# Patient Record
Sex: FEMALE | Race: WHITE | Employment: FULL TIME | URBAN - METROPOLITAN AREA
[De-identification: names, ages, dates, MRNs, and addresses within clinical notes are randomized per-mention and may not be internally consistent; named-entity substitution may affect disease eponyms.]

---

## 2017-02-17 LAB
T4 FREE SERPL-MCNC: 1.27 NG/DL (ref 0.82–1.77)
TSH SERPL DL<=0.005 MIU/L-ACNC: 0.66 UIU/ML (ref 0.45–4.5)

## 2017-04-11 ENCOUNTER — HOSPITAL ENCOUNTER (EMERGENCY)
Age: 32
Discharge: HOME | End: 2017-04-11
Attending: FAMILY MEDICINE

## 2017-04-12 ENCOUNTER — TELEPHONE (OUTPATIENT)
Dept: ENDOCRINOLOGY | Age: 32
End: 2017-04-12

## 2017-04-12 LAB
T4 FREE SERPL-MCNC: 2.95 NG/DL (ref 0.82–1.77)
TSH SERPL DL<=0.005 MIU/L-ACNC: 0.01 UIU/ML (ref 0.45–4.5)

## 2017-04-12 NOTE — TELEPHONE ENCOUNTER
----- Message from Aldair Lawson MD sent at 4/12/2017  2:50 PM EDT -----  Thyroid is fast - so I donot think we can stop Methimazole    She has to restart 5 mg of Methimazole     LAbs TSH ,FT4 BNV

## 2017-04-12 NOTE — TELEPHONE ENCOUNTER
Called patient and gave her 's recommendations of restarting 5mg of methimazole daily due to overactive thyroid. Patient verbalized understanding.

## 2017-04-12 NOTE — PROGRESS NOTES
Thyroid is fast - so I donot think we can stop Methimazole    She has to restart 5 mg of Methimazole     LAbs TSH ,FT4 BNV

## 2017-04-12 NOTE — TELEPHONE ENCOUNTER
Attempted to call. Unsuccessful. Left msg for Roseline Harris to give us a call back at the office. A callback number was left.

## 2017-04-13 ENCOUNTER — TELEPHONE (OUTPATIENT)
Dept: ENDOCRINOLOGY | Age: 32
End: 2017-04-13

## 2017-04-15 ENCOUNTER — HOSPITAL ENCOUNTER (EMERGENCY)
Age: 32
Discharge: HOME | End: 2017-04-15
Attending: FAMILY MEDICINE

## 2017-04-15 ENCOUNTER — HOSPITAL ENCOUNTER (OUTPATIENT)
Dept: LAB | Age: 32
Discharge: HOME | End: 2017-04-15

## 2017-04-15 VITALS
TEMPERATURE: 98.4 F | BODY MASS INDEX: 27.99 KG/M2 | RESPIRATION RATE: 18 BRPM | HEART RATE: 98 BPM | DIASTOLIC BLOOD PRESSURE: 63 MMHG | OXYGEN SATURATION: 99 % | WEIGHT: 168 LBS | HEIGHT: 65 IN | SYSTOLIC BLOOD PRESSURE: 134 MMHG

## 2017-04-15 DIAGNOSIS — J06.9 VIRAL URI: Primary | ICD-10-CM

## 2017-04-15 LAB
INFLUENZA A AG (POC): NORMAL
INFLUENZA AG (POC) NEGATIVE CTRL.: NORMAL
INFLUENZA AG (POC) POSITIVE CTRL.: NORMAL
INFLUENZA B AG (POC): NORMAL
LOT NUMBER POC: NORMAL
S PYO AG THROAT QL: NEGATIVE

## 2017-04-15 PROCEDURE — 87070 CULTURE OTHR SPECIMN AEROBIC: CPT | Performed by: FAMILY MEDICINE

## 2017-04-15 NOTE — DISCHARGE INSTRUCTIONS
Viral Respiratory Infection: Care Instructions  Your Care Instructions  Viruses are very small organisms. They grow in number after they enter your body. There are many types that cause different illnesses, such as colds and the mumps. The symptoms of a viral respiratory infection often start quickly. They include a fever, sore throat, and runny nose. You may also just not feel well. Or you may not want to eat much. Most viral respiratory infections are not serious. They usually get better with time and self-care. Antibiotics are not used to treat a viral infection. That's because antibiotics will not help cure a viral illness. In some cases, antiviral medicine can help your body fight a serious viral infection. Follow-up care is a key part of your treatment and safety. Be sure to make and go to all appointments, and call your doctor if you are having problems. It's also a good idea to know your test results and keep a list of the medicines you take. How can you care for yourself at home? · Rest as much as possible until you feel better. · Be safe with medicines. Take your medicine exactly as prescribed. Call your doctor if you think you are having a problem with your medicine. You will get more details on the specific medicine your doctor prescribes. · Take an over-the-counter pain medicine, such as acetaminophen (Tylenol), ibuprofen (Advil, Motrin), or naproxen (Aleve), as needed for pain and fever. Read and follow all instructions on the label. Do not give aspirin to anyone younger than 20. It has been linked to Reye syndrome, a serious illness. · Drink plenty of fluids, enough so that your urine is light yellow or clear like water. Hot fluids, such as tea or soup, may help relieve congestion in your nose and throat. If you have kidney, heart, or liver disease and have to limit fluids, talk with your doctor before you increase the amount of fluids you drink.   · Try to clear mucus from your lungs by breathing deeply and coughing. · Gargle with warm salt water once an hour. This can help reduce swelling and throat pain. Use 1 teaspoon of salt mixed in 1 cup of warm water. · Do not smoke or allow others to smoke around you. If you need help quitting, talk to your doctor about stop-smoking programs and medicines. These can increase your chances of quitting for good. To avoid spreading the virus  · Cough or sneeze into a tissue. Then throw the tissue away. · If you don't have a tissue, use your hand to cover your cough or sneeze. Then clean your hand. You can also cough into your sleeve. · Wash your hands often. Use soap and warm water. Wash for 15 to 20 seconds each time. · If you don't have soap and water near you, you can clean your hands with alcohol wipes or gel. When should you call for help? Call your doctor now or seek immediate medical care if:  · You have a new or higher fever. · Your fever lasts more than 48 hours. · You have trouble breathing. · You have a fever with a stiff neck or a severe headache. · You are sensitive to light. · You feel very sleepy or confused. Watch closely for changes in your health, and be sure to contact your doctor if:  · You do not get better as expected. Where can you learn more? Go to http://nallely-art.info/. Enter G470 in the search box to learn more about \"Viral Respiratory Infection: Care Instructions. \"  Current as of: June 30, 2016  Content Version: 11.2  © 1210-0550 [employer]. Care instructions adapted under license by [employer] (which disclaims liability or warranty for this information). If you have questions about a medical condition or this instruction, always ask your healthcare professional. [address] any warranty or liability for your use of this information.

## 2017-04-15 NOTE — UC PROVIDER NOTE
Patient is a 29 y.o. female presenting with cough. The history is provided by the patient. Cough   This is a new problem. The current episode started yesterday. The problem occurs every few minutes. The problem has not changed since onset. The cough is non-productive. Patient reports a subjective fever - was not measured. The fever has been present for less than 1 day. Associated symptoms include rhinorrhea, sore throat and myalgias. Pertinent negatives include no chest pain, no chills, no sweats, no ear congestion, no ear pain, no headaches, no shortness of breath, no wheezing, no nausea and no vomiting. Treatments tried: theraflu. The treatment provided no relief. Her past medical history does not include asthma. Past Medical History:   Diagnosis Date    Endocrine disease     hypothyroid    Goiter     Graves disease     Prediabetes     Retinal detachment         Past Surgical History:   Procedure Laterality Date    HX HEENT      jaw surgery    HX TUMOR REMOVAL      facial tumor removed         Family History   Problem Relation Age of Onset    Diabetes Father         Social History     Social History    Marital status:      Spouse name: N/A    Number of children: N/A    Years of education: N/A     Occupational History    Not on file. Social History Main Topics    Smoking status: Never Smoker    Smokeless tobacco: Never Used    Alcohol use No    Drug use: No    Sexual activity: Yes     Partners: Male     Birth control/ protection: None     Other Topics Concern    Not on file     Social History Narrative                ALLERGIES: Ibuprofen    Review of Systems   Constitutional: Negative for chills and fever. HENT: Positive for congestion, rhinorrhea and sore throat. Negative for ear pain. Respiratory: Positive for cough. Negative for shortness of breath and wheezing. Cardiovascular: Negative for chest pain and palpitations.    Gastrointestinal: Negative for nausea and vomiting. Musculoskeletal: Positive for myalgias. Skin: Negative for rash. Neurological: Negative for headaches. Hematological: Negative for adenopathy. Vitals:    04/15/17 1115   BP: 134/63   Pulse: 98   Resp: 18   Temp: 98.4 °F (36.9 °C)   SpO2: 99%   Weight: 76.2 kg (168 lb)   Height: 5' 5\" (1.651 m)       Physical Exam   Constitutional: She appears well-developed and well-nourished. No distress. HENT:   Right Ear: Tympanic membrane, external ear and ear canal normal.   Left Ear: Tympanic membrane, external ear and ear canal normal.   Nose: Rhinorrhea present. Right sinus exhibits no maxillary sinus tenderness and no frontal sinus tenderness. Left sinus exhibits no maxillary sinus tenderness and no frontal sinus tenderness. Mouth/Throat: Mucous membranes are normal. Posterior oropharyngeal edema and posterior oropharyngeal erythema present. No oropharyngeal exudate or tonsillar abscesses. Cardiovascular: Normal rate, regular rhythm and normal heart sounds. Pulmonary/Chest: Effort normal and breath sounds normal. No respiratory distress. She has no wheezes. She has no rales. Lymphadenopathy:     She has no cervical adenopathy. Neurological: She is alert. Skin: She is not diaphoretic. Psychiatric: She has a normal mood and affect. Her behavior is normal. Judgment and thought content normal.   Nursing note and vitals reviewed. MDM     Differential Diagnosis; Clinical Impression; Plan:     CLINICAL IMPRESSION:  Viral URI  (primary encounter diagnosis)    Plan:  1. Increase fluids  2. Tylenol/motrin prn  3. PCP if no improvement  Amount and/or Complexity of Data Reviewed:   Clinical lab tests:  Ordered and reviewed  Risk of Significant Complications, Morbidity, and/or Mortality:   Presenting problems: Moderate  Diagnostic procedures: Moderate  Management options:   Moderate  General Comments: Strep test: negative  Influenza test: negative  Progress:   Patient progress: Stable      Procedures

## 2017-04-17 LAB
BACTERIA SPEC CULT: NORMAL
SERVICE CMNT-IMP: NORMAL

## 2017-04-21 ENCOUNTER — OFFICE VISIT (OUTPATIENT)
Dept: INTERNAL MEDICINE CLINIC | Age: 32
End: 2017-04-21

## 2017-04-21 VITALS
SYSTOLIC BLOOD PRESSURE: 119 MMHG | DIASTOLIC BLOOD PRESSURE: 73 MMHG | TEMPERATURE: 98 F | BODY MASS INDEX: 27.99 KG/M2 | WEIGHT: 168 LBS | HEIGHT: 65 IN | RESPIRATION RATE: 16 BRPM | OXYGEN SATURATION: 99 % | HEART RATE: 84 BPM

## 2017-04-21 DIAGNOSIS — H61.21 IMPACTED CERUMEN, RIGHT EAR: ICD-10-CM

## 2017-04-21 DIAGNOSIS — Z00.00 WELL WOMAN EXAM (NO GYNECOLOGICAL EXAM): Primary | ICD-10-CM

## 2017-04-21 DIAGNOSIS — R73.03 PREDIABETES: ICD-10-CM

## 2017-04-21 DIAGNOSIS — Z88.9 H/O SEASONAL ALLERGIES: ICD-10-CM

## 2017-04-21 NOTE — PROGRESS NOTES
Chief Complaint   Patient presents with    Complete Physical    Allergies     has been coughing for 1 week     she is a 29y.o. year old female who presents for CPE  Complete Physical Exam  Pre-Visit Questions:    LMP -  [id number]  Last Pap - October2016  Last Mammogram -n/a  Hx of abnl Pap -  yes    1. Do you follow a low fat diet?  no  2. Are you up to date on your Tdap (<10 years)? Yes  3. Have you ever had a Pneumovax vaccine (>65)? No   PCV13 No   PPSV23 No  4. Have you had Zoster vaccine (>60)? No  5. Have you had the HPV - Gardasil (13- 26)? No  6. Do you follow an exercise program?  yes  7. Do you smoke?  no  8. Do you consider yourself overweight?  yes  9. Is there a family history of CAD< age 48? Unknown  10. Is there a family history of Cancer?  yes  11. Do you know your Cancer risks? Yes  12. Have you had a colonoscopy?  no  13. Have you been tested for HIV or other STI's? Yes HIV testing today(18-66 y/o)? Yes  14. Have had a bone density scan or DEXA done(>65)? No  15. Have you had an EKG performed in the last five years (>50)? Unknown    Other complaints:    Reviewed and agree with Nurse Note and duplicated in this note. Reviewed PmHx, RxHx, FmHx, SocHx, AllgHx and updated and dated in the chart.     Family History   Problem Relation Age of Onset    Diabetes Father        Past Medical History:   Diagnosis Date    Endocrine disease     hypothyroid    Goiter     Graves disease     Prediabetes     Retinal detachment       Social History     Social History    Marital status:      Spouse name: N/A    Number of children: N/A    Years of education: N/A     Social History Main Topics    Smoking status: Never Smoker    Smokeless tobacco: Never Used    Alcohol use No    Drug use: No    Sexual activity: Yes     Partners: Male     Birth control/ protection: None     Other Topics Concern    None     Social History Narrative        Review of Systems - negative except as listed above  {ros master:593763}    Objective:     Vitals:    04/21/17 0934   Weight: 168 lb (76.2 kg)       Physical Examination: {female adult master:253251}    Patient was informed/counseled on: Body mass index is 27.96 kg/(m^2). Discussed the patient's {MU BMI REASON:857347997} BMI with her. The BMI follow up plan is as follows: {PLAN -  Place appropriate order along with your documentation:45965}    Assessment/ Plan:   There are no diagnoses linked to this encounter. Follow-up Disposition: Not on File    Labs to be drawn: CBC, CMP, Lipid, Vit d - 25    I have discussed the diagnosis with the patient and the intended plan as seen in the above orders. The patient has received an after-visit summary and questions were answered concerning future plans. Medication Side Effects and Warnings were discussed with patient: {yes/ no default yes:28609::\"yes\"}  Patient Labs were reviewed and or requested: {yes/ no default yes:11062::\"yes\"}  Patient Past Records were reviewed and or requested  {yes/ no default yes:73909::\"yes\"}  I have discussed the diagnosis with the patient and the intended plan as seen in the above orders. The patient has received an after-visit summary and questions were answered concerning future plans. Pt agrees to call or return to clinic and/or go to closest ER with any worsening of symptoms. This may include, but not limited to increased fever (>100.4) with NSAIDS or Tylenol, increased edema, confusion, rash, worsening of presenting symptoms. Patient was informed/counseled to:    1) Remember to stay active and/or exercise regularly (I suggest 30-45 minutes daily)   2) For reliable dietary information, go to www. EATRIGHT.org. You may wish to consider seeing the nutritionist at [facility] at #[phone] or [phone], also consider the [phone]   3) I routinely suggest a complete physical exam once each year (your birth month)

## 2017-04-21 NOTE — PROGRESS NOTES
CCP: [facility]    S: Idania Rg is a 29 y.o. female who presents to [facility] and for CPE. Complete Physical Exam  Pre-Visit Questions:    LMP -  [id number]  Last Pap - October2016  Last Mammogram -n/a  Hx of abnl Pap -  yes    1. Do you follow a low fat diet?  no  2. Are you up to date on your Tdap (<10 years)? Yes  3. Have you ever had a Pneumovax vaccine (>65)? No   PCV13 No   PPSV23 No  4. Have you had Zoster vaccine (>60)? No  5. Have you had the HPV - Gardasil (13- 26)? No  6. Do you follow an exercise program?  yes  7. Do you smoke?  no  8. Do you consider yourself overweight?  yes  9. Is there a family history of CAD< age 48? Unknown  10. Is there a family history of Cancer?  yes  11. Do you know your Cancer risks? Yes  12. Have you had a colonoscopy?  no  13. Have you been tested for HIV or other STI's? Yes HIV testing today(18-66 y/o)? Yes  14. Have had a bone density scan or DEXA done(>65)? No  15. Have you had an EKG performed in the last five years (>50)? UnknownShe has having seasonal allergies for past week. C/o ryunny nose, non-productive cough that is worse at night. Has tried benadryl at night which helps her sleep but not helping with cough. Sees Dr. Josefina Sandoval for her Grave's Disease. She is taking 5mg methimazole. Reports she feels thirsty a lot and increased hunger. Was pre-diabetic at last A1C check June 2016. Patient's last menstrual period was 04/04/2017. Birth Control: Pt is currently taking MARTA for birth control which she is taking as prescribed without any side effects or difficulties. Denies GYN symptoms including discharge, itching, dyspareunia, or recent changes in cycle. Denies excessive cramping, bloating, moodiness or breast tenderness. Family history significant for DM (father and mother) and BRCA (paternal aunt).      Social history:   Nutrition: eats traditional [place] foods, pork, chicken and stir-fried veggies, lots of rice each meal; drinking soda, 1-2 cups a day at work  Physical: no formal exercise, doesn't have time with work schedule  Occupation: works at [address] [employer] Ish: denies  Drug: denies  Alcohol: denies    Review of Systems:  - Constitutional Symptoms: no fevers, chills, weight loss  - Eyes: no blurry vision or double vision  - Cardiovascular: no chest pain or palpitations  - Respiratory: no cough or shortness of breath  - Gastrointestinal: no dysphagia or abdominal pain  - Musculoskeletal: no joint pains or weakness  - Neurological: no numbness, tingling, or headaches  - Psychiatric: no depression or anxiety      Past Medical History:   Diagnosis Date    Endocrine disease     hypothyroid    Goiter     Graves disease     Prediabetes     Retinal detachment        Current Outpatient Prescriptions   Medication Sig Dispense Refill    FALMINA, 28, 0.1-20 mg-mcg tab       methIMAzole (TAPAZOLE) 5 mg tablet 1 tab daily 30 Tab 8       Pt is taking all medications as prescribed without any side effects or difficulty. Allergies   Allergen Reactions    Ibuprofen Swelling     Eyes            Health Maintenance:    PAP: last 10/2016 at [facility], reports it was normal  Eye exam: march 2017    O: VS:   Visit Vitals    /73 (BP 1 Location: Left arm, BP Patient Position: Sitting)    Pulse 84    Temp 98 °F (36.7 °C) (Oral)    Resp 16    Ht 5' 5\" (1.651 m)    Wt 168 lb (76.2 kg)    LMP 04/04/2017    SpO2 99%    BMI 27.96 kg/m2     GENERAL: Diana Wheat is sitting on exam table in no acute distress. Non-toxic. Well nourished. Well developed. Appropriately groomed. HEAD:  Normocephalic. Atraumatic. Non tender sinuses x 4. EYE: PERRLA. EOMs intact. Sclera anicteric without injection. No drainage or discharge. EARS: R ear canal cerumen obstructing TM; Hearing intact bilaterally. External ear canals normal without evidence of blood or swelling. left TM's intact, pearly grey with landmarks visible. No erythema or effusion.    NOSE: Mildly erythematous; patent. Nasal turbinates pink. No polyps noted. No erythema. No discharge. MOUTH: mucous membranes pink and moist. Posterior pharynx normal with cobblestone appearance. No erythema, white exudate or obstruction. NECK: small goiter visible and palpable; supple. Midline trachea. RESP: Breath sounds are symmetrical bilaterally. Unlabored without SOB. Speaking in full sentences. Clear to auscultation bilaterally anteriorly and posteriorly. No wheezes. No rales or rhonchi. CV: normal rate. Regular rhythm. S1, S2 audible. No murmur noted. No rubs, clicks or gallops noted. ABDOMEN: Flat without bulges or pulsations. Soft and nondistended. No tenderness on palpation. No masses or organomegaly. No rebound, rigidity or guarding. Bowel sounds normal x 4 quadrants. NEURO:  awake, alert and oriented to person, place, and time and event. Cranial nerves II through XII grossly intact. Clear speech. Muscle strength is +5/5 x 4 extremities. Sensation is intact to light touch bilaterally. Steady gait. MUSC:  Intact x 4 extremities. Full ROM x 4 extremities. No pain with movement. HEME/LYMPH: peripheral pulses palpable 2+ radial.  No peripheral edema is noted. No cervical adenopathy noted. SKIN: clean dry and intact throughout. No rashes, erythema, ecchymosis, lacerations, abrasions, suspicious moles noted. PSYCH: appropriate behavior, dress and thought processes. Good eye contact. Clear and coherent speech. Full affect. Good insight. Assessment and Plan:  Jaime Escalera was seen today for complete physical and allergies.     Diagnoses and all orders for this visit:    Well woman exam (no gynecological exam)  -     CBC W/O DIFF  -     METABOLIC PANEL, COMPREHENSIVE  -     LIPID PANEL  -     VITAMIN D, 25 HYDROXY  -     HEMOGLOBIN A1C WITH EAG  -     HIV 1/2 AG/AB, 4TH GENERATION,W RFLX CONFIRM    Prediabetes    H/O seasonal allergies    Impacted cerumen, right ear  -     REMOVE IMPACTED EAR WAX    Discussed allergy mgmt and mgmt of postnasal drip (see pt instructions). Also discussed in detail need for lifestyle changes, cutting back on rice and soda in diet due to pre-diabetes state, current symptoms of polyuria and polydipsia and famiy history of DM. Will do labs today to assess for progress of DM. Patient education was done. Advised on nutrition, physical activity, weight management, tobacco, alcohol and safety. Counseling included discussion of diagnosis, differentials, treatment options, prescribed treatment, warning signs and follow up. Medication risks/benefits,interactions and alternatives discussed with patient.      Patient verbalized understanding and agreed to plan of care. Patient was given an after visit summary which included current diagnoses, medications and vital signs. Request [facility] records today for pap results      Follow up as needed     Patient Instructions   Try to cut back on your portions of rice and soda during the day. Limit caffeinated beverages to one per day. Allergy management:     1)  Take a daily antihistamine to reduce allergic symptoms such as sneezing, runny nose and itchy eyes. You can buy these over the counter (OTC = no prescription needed) such as Claritin, Allegra or Zyrtec. Take this daily as it takes 3-4 weeks for the full therapeutic effect to take place. Follow directions on package. If symptoms of sneezing, coughing and runny nose are severe, you can try taking Benadryl at night before bed. However, Benadryl can cause drowsiness, so please use caution. Elderly people should not use Benadryl due to side effects and increase risk of falling. 2)  Use an OTC decongestant nasal spray such as Flonase, Nasonex, or Rhinocort. These contain a steroid and will help reduce congestion. You can spray 2x in each nostril two times a day.   Use the opposite hand of the nostril you are spraying and look down at your toes when you administer the nasal spray. This ensures the spray is applied to the nasal tissue properly. 3) You can also use a saline nasal spray 3-4 times a day or a mia potty (never use tap water due to possible bacterial contamination) to help flush out the sinuses. This helps reduce the irritants that can increase allergic symptoms. 4)  OTC Robitussin or Delsym can be used for cough relief. Follow directions on package. Do not exceed maximum dose. May cause drowsiness    5) Warm salt water gargle can help alleviate sore throat    6) Try taking a teaspoon of local honey 2x day (but no more than 1 tablespoon a day) - to help strengthen your body's tolerance to allergens. It is important to buy local honey as the bees use plants in your area to produce their honey. Honey is also a natural cough suppressant. Eat a healthy diet, drink plenty of water and get 8 hours of sleep nightly.

## 2017-04-21 NOTE — PATIENT INSTRUCTIONS
Try to cut back on your portions of rice and soda during the day. Limit caffeinated beverages to one per day. Allergy management:     1)  Take a daily antihistamine to reduce allergic symptoms such as sneezing, runny nose and itchy eyes. You can buy these over the counter (OTC = no prescription needed) such as Claritin, Allegra or Zyrtec. Take this daily as it takes 3-4 weeks for the full therapeutic effect to take place. Follow directions on package. If symptoms of sneezing, coughing and runny nose are severe, you can try taking Benadryl at night before bed. However, Benadryl can cause drowsiness, so please use caution. Elderly people should not use Benadryl due to side effects and increase risk of falling. 2)  Use an OTC decongestant nasal spray such as Flonase, Nasonex, or Rhinocort. These contain a steroid and will help reduce congestion. You can spray 2x in each nostril two times a day. Use the opposite hand of the nostril you are spraying and look down at your toes when you administer the nasal spray. This ensures the spray is applied to the nasal tissue properly. 3) You can also use a saline nasal spray 3-4 times a day or a mia potty (never use tap water due to possible bacterial contamination) to help flush out the sinuses. This helps reduce the irritants that can increase allergic symptoms. 4)  OTC Robitussin or Delsym can be used for cough relief. Follow directions on package. Do not exceed maximum dose. May cause drowsiness    5) Warm salt water gargle can help alleviate sore throat    6) Try taking a teaspoon of local honey 2x day (but no more than 1 tablespoon a day) - to help strengthen your body's tolerance to allergens. It is important to buy local honey as the bees use plants in your area to produce their honey. Honey is also a natural cough suppressant. Eat a healthy diet, drink plenty of water and get 8 hours of sleep nightly.

## 2017-04-21 NOTE — MR AVS SNAPSHOT
Visit Information Date & Time Provider Department Dept. Phone Encounter #  
 4/21/2017  9:30 AM Chauncey England MD [facility] Sports Medicine and [place] [id number] Follow-up Instructions Return if symptoms worsen or fail to improve. Follow-up and Disposition History Your Appointments 6/14/2017  1:30 PM  
ROUTINE CARE with Lang Pryor MD  
Care Diabetes & Endocrinology [facility]) Appt Note: 6mo fudm 100 15[facility] [address] [facility] [id number] [phone]  
  
   
 [address] [place] [id number] Upcoming Health Maintenance Date Due DTaP/Tdap/Td series (1 - Tdap) 1/18/2008 PAP AKA CERVICAL CYTOLOGY 6/6/2016 Allergies as of 4/21/2017  Review Complete On: 4/21/2017 By: Chauncey England MD  
  
 Severity Noted Reaction Type Reactions Ibuprofen  03/24/2014    Swelling Eyes Current Immunizations  Reviewed on 6/24/2013 Name Date  
 TB Skin Test (PPD) Intradermal 6/24/2013 Not reviewed this visit You Were Diagnosed With   
  
 Codes Comments Well woman exam (no gynecological exam)    -  Primary ICD-10-CM: Z00.00 ICD-9-CM: V70.0 Prediabetes     ICD-10-CM: R73.03 
ICD-9-CM: 790.29   
 H/O seasonal allergies     ICD-10-CM: Z88.9 ICD-9-CM: V15.09 Impacted cerumen, right ear     ICD-10-CM: H61.21 ICD-9-CM: 380.4 Vitals BP Pulse Temp Resp Height(growth percentile) Weight(growth percentile) 119/73 (BP 1 Location: Left arm, BP Patient Position: Sitting) 84 98 °F (36.7 °C) (Oral) 16 5' 5\" (1.651 m) 168 lb (76.2 kg) LMP SpO2 BMI OB Status Smoking Status 04/04/2017 99% 27.96 kg/m2 Having regular periods Never Smoker Vitals History BMI and BSA Data Body Mass Index Body Surface Area  
 27.96 kg/m 2 1.87 m 2 Preferred Pharmacy Pharmacy Name Phone [facility] Artemisa Epley, [address] [place] [facility] [phone] Your Updated Medication List  
  
   
This list is accurate as of: 4/21/17 10:52 AM.  Always use your most recent med list.  
  
  
  
  
 [place] Haste (28) 0.1-20 mg-mcg Tab Generic drug:  levonorgestrel-ethinyl estradiol  
  
 methIMAzole 5 mg tablet Commonly known as:  TAPAZOLE  
1 tab daily We Performed the Following CBC W/O DIFF [05700 CPT(R)] HEMOGLOBIN A1C WITH EAG [02175 CPT(R)] HIV 1/2 AG/AB, 4TH GENERATION,W RFLX CONFIRM [BGE78009 Custom] LIPID PANEL [72159 CPT(R)] METABOLIC PANEL, COMPREHENSIVE [95672 CPT(R)] REMOVE IMPACTED EAR WAX [43940 CPT(R)] VITAMIN D, 25 HYDROXY [id number] CPT(R)] Follow-up Instructions Return if symptoms worsen or fail to improve. Patient Instructions Try to cut back on your portions of rice and soda during the day. Limit caffeinated beverages to one per day. Allergy management:  
 
1)  Take a daily antihistamine to reduce allergic symptoms such as sneezing, runny nose and itchy eyes. You can buy these over the counter (OTC = no prescription needed) such as Claritin, Allegra or Zyrtec. Take this daily as it takes 3-4 weeks for the full therapeutic effect to take place. Follow directions on package. If symptoms of sneezing, coughing and runny nose are severe, you can try taking Benadryl at night before bed. However, Benadryl can cause drowsiness, so please use caution. Elderly people should not use Benadryl due to side effects and increase risk of falling. 2)  Use an OTC decongestant nasal spray such as Flonase, Nasonex, or Rhinocort. These contain a steroid and will help reduce congestion. You can spray 2x in each nostril two times a day. Use the opposite hand of the nostril you are spraying and look down at your toes when you administer the nasal spray. This ensures the spray is applied to the nasal tissue properly.    
 
3) You can also use a saline nasal spray 3-4 times a day or a mia potty (never use tap water due to possible bacterial contamination) to help flush out the sinuses. This helps reduce the irritants that can increase allergic symptoms. 4)  OTC Robitussin or Delsym can be used for cough relief. Follow directions on package. Do not exceed maximum dose. May cause drowsiness 5) Warm salt water gargle can help alleviate sore throat 6) Try taking a teaspoon of local honey 2x day (but no more than 1 tablespoon a day) - to help strengthen your body's tolerance to allergens. It is important to buy local honey as the bees use plants in your area to produce their honey. Honey is also a natural cough suppressant. Eat a healthy diet, drink plenty of water and get 8 hours of sleep nightly. Introducing [facility] & HEALTH SERVICES! Mati Levin introduces [employer] patient portal. Now you can access parts of your medical record, email your doctor's office, and request medication refills online. 1. In your internet browser, go to [email or web address] 2. Click on the First Time User? Click Here link in the Sign In box. You will see the New Member Sign Up page. 3. Enter your [employer] Access Code exactly as it appears below. You will not need to use this code after youve completed the sign-up process. If you do not sign up before the expiration date, you must request a new code. · [employer] Access Code: [email or web address] Expires: 7/14/2017 11:04 AM 
 
4. Enter the last four digits of your Social Security Number (xxxx) and Date of Birth (mm/dd/yyyy) as indicated and click Submit. You will be taken to the next sign-up page. 5. Create a [employer] ID. This will be your [employer] login ID and cannot be changed, so think of one that is secure and easy to remember. 6. Create a [employer] password. You can change your password at any time. 7. Enter your Password Reset Question and Answer. This can be used at a later time if you forget your password. 8. Enter your e-mail address. You will receive e-mail notification when new information is available in [address] 9. Click Sign Up. You can now view and download portions of your medical record. 10. Click the Download Summary menu link to download a portable copy of your medical information. If you have questions, please visit the Frequently Asked Questions section of the [employer] website. Remember, [employer] is NOT to be used for urgent needs. For medical emergencies, dial 911. Now available from your iPhone and Android! Please provide this summary of care documentation to your next provider. Your primary care clinician is listed as Willi DELA CRUZ. If you have any questions after today's visit, please call [phone].

## 2017-04-22 LAB
25(OH)D3+25(OH)D2 SERPL-MCNC: 14.4 NG/ML (ref 30–100)
ALBUMIN SERPL-MCNC: 4.4 G/DL (ref 3.5–5.5)
ALBUMIN/GLOB SERPL: 1.6 {RATIO} (ref 1.2–2.2)
ALP SERPL-CCNC: 39 IU/L (ref 39–117)
ALT SERPL-CCNC: 10 IU/L (ref 0–32)
AST SERPL-CCNC: 10 IU/L (ref 0–40)
BILIRUB SERPL-MCNC: <0.2 MG/DL (ref 0–1.2)
BUN SERPL-MCNC: 9 MG/DL (ref 6–20)
BUN/CREAT SERPL: 19 (ref 9–23)
CALCIUM SERPL-MCNC: 8.9 MG/DL (ref 8.7–10.2)
CHLORIDE SERPL-SCNC: 104 MMOL/L (ref 96–106)
CHOLEST SERPL-MCNC: 167 MG/DL (ref 100–199)
CO2 SERPL-SCNC: 23 MMOL/L (ref 18–29)
CREAT SERPL-MCNC: 0.48 MG/DL (ref 0.57–1)
ERYTHROCYTE [DISTWIDTH] IN BLOOD BY AUTOMATED COUNT: 12.6 % (ref 12.3–15.4)
EST. AVERAGE GLUCOSE BLD GHB EST-MCNC: 123 MG/DL
GLOBULIN SER CALC-MCNC: 2.7 G/DL (ref 1.5–4.5)
GLUCOSE SERPL-MCNC: 97 MG/DL (ref 65–99)
HBA1C MFR BLD: 5.9 % (ref 4.8–5.6)
HCT VFR BLD AUTO: 33.8 % (ref 34–46.6)
HDLC SERPL-MCNC: 38 MG/DL
HGB BLD-MCNC: 11 G/DL (ref 11.1–15.9)
HIV 1+2 AB+HIV1 P24 AG SERPL QL IA: NON REACTIVE
LDLC SERPL CALC-MCNC: 105 MG/DL (ref 0–99)
MCH RBC QN AUTO: 25.5 PG (ref 26.6–33)
MCHC RBC AUTO-ENTMCNC: 32.5 G/DL (ref 31.5–35.7)
MCV RBC AUTO: 78 FL (ref 79–97)
PLATELET # BLD AUTO: 330 X10E3/UL (ref 150–379)
POTASSIUM SERPL-SCNC: 4.4 MMOL/L (ref 3.5–5.2)
PROT SERPL-MCNC: 7.1 G/DL (ref 6–8.5)
RBC # BLD AUTO: 4.31 X10E6/UL (ref 3.77–5.28)
SODIUM SERPL-SCNC: 141 MMOL/L (ref 134–144)
TRIGL SERPL-MCNC: 121 MG/DL (ref 0–149)
VLDLC SERPL CALC-MCNC: 24 MG/DL (ref 5–40)
WBC # BLD AUTO: 6.6 X10E3/UL (ref 3.4–10.8)

## 2017-06-08 LAB
T4 FREE SERPL-MCNC: 0.81 NG/DL (ref 0.82–1.77)
TSH SERPL DL<=0.005 MIU/L-ACNC: 4.12 UIU/ML (ref 0.45–4.5)

## 2017-06-14 ENCOUNTER — OFFICE VISIT (OUTPATIENT)
Dept: ENDOCRINOLOGY | Age: 32
End: 2017-06-14

## 2017-06-14 VITALS
TEMPERATURE: 98.6 F | OXYGEN SATURATION: 100 % | WEIGHT: 172.4 LBS | SYSTOLIC BLOOD PRESSURE: 111 MMHG | BODY MASS INDEX: 28.72 KG/M2 | HEART RATE: 64 BPM | HEIGHT: 65 IN | RESPIRATION RATE: 16 BRPM | DIASTOLIC BLOOD PRESSURE: 54 MMHG

## 2017-06-14 DIAGNOSIS — E05.00 GRAVES' DISEASE: Primary | ICD-10-CM

## 2017-06-14 DIAGNOSIS — R73.03 PREDIABETES: ICD-10-CM

## 2017-06-14 DIAGNOSIS — E04.0 GOITER DIFFUSE: ICD-10-CM

## 2017-06-14 DIAGNOSIS — E05.00 GRAVES' DISEASE: ICD-10-CM

## 2017-06-14 RX ORDER — METHIMAZOLE 5 MG/1
TABLET ORAL
Qty: 30 TAB | Refills: 8 | Status: SHIPPED | OUTPATIENT
Start: 2017-06-14 | End: 2017-08-08 | Stop reason: ALTCHOICE

## 2017-06-14 NOTE — MR AVS SNAPSHOT
Visit Information Date & Time Provider Department Dept. Phone Encounter #  
 6/14/2017  1:30 PM Silver Wade MD [facility] Diabetes & Endocrinology [phone] [id number] Follow-up Instructions Return in about 6 months (around 12/14/2017). Upcoming Health Maintenance Date Due DTaP/Tdap/Td series (1 - Tdap) 1/18/2008 INFLUENZA AGE 9 TO ADULT 8/1/2017 PAP AKA CERVICAL CYTOLOGY 11/3/2019 Allergies as of 6/14/2017  Review Complete On: 6/14/2017 By: Silver Wade MD  
  
 Severity Noted Reaction Type Reactions Ibuprofen  03/24/2014    Swelling Eyes Current Immunizations  Reviewed on 6/24/2013 Name Date  
 TB Skin Test (PPD) Intradermal 6/24/2013 Not reviewed this visit You Were Diagnosed With   
  
 Codes Comments Graves' disease    -  Primary ICD-10-CM: E05.00 ICD-9-CM: 242.00 Goiter diffuse     ICD-10-CM: E04.9 ICD-9-CM: 240.9 Vitals BP Pulse Temp Resp Height(growth percentile) Weight(growth percentile) 111/54 (BP 1 Location: Left arm, BP Patient Position: Sitting) 64 98.6 °F (37 °C) (Oral) 16 5' 5\" (1.651 m) 172 lb 6.4 oz (78.2 kg) SpO2 BMI OB Status Smoking Status 100% 28.69 kg/m2 Having regular periods Never Smoker BMI and BSA Data Body Mass Index Body Surface Area  
 28.69 kg/m 2 1.89 m 2 Preferred Pharmacy Pharmacy Name Phone [facility] , [address] [place] [phone] Your Updated Medication List  
  
   
This list is accurate as of: 6/14/17  2:18 PM.  Always use your most recent med list.  
  
  
  
  
 Terry Bob (28) 0.1-20 mg-mcg Tab Generic drug:  levonorgestrel-ethinyl estradiol  
  
 methIMAzole 5 mg tablet Commonly known as:  TAPAZOLE  
1 tab daily Follow-up Instructions Return in about 6 months (around 12/14/2017). Introducing [facility] & HEALTH SERVICES! Malia Merino introduces [employer] patient portal. Now you can access parts of your medical record, email your doctor's office, and request medication refills online. 1. In your internet browser, go to [email or web address] 2. Click on the First Time User? Click Here link in the Sign In box. You will see the New Member Sign Up page. 3. Enter your [employer] Access Code exactly as it appears below. You will not need to use this code after youve completed the sign-up process. If you do not sign up before the expiration date, you must request a new code. · [employer] Access Code: [email or web address] Expires: 7/14/2017 11:04 AM 
 
4. Enter the last four digits of your Social Security Number (xxxx) and Date of Birth (mm/dd/yyyy) as indicated and click Submit. You will be taken to the next sign-up page. 5. Create a [employer] ID. This will be your [employer] login ID and cannot be changed, so think of one that is secure and easy to remember. 6. Create a [employer] password. You can change your password at any time. 7. Enter your Password Reset Question and Answer. This can be used at a later time if you forget your password. 8. Enter your e-mail address. You will receive e-mail notification when new information is available in [address] 9. Click Sign Up. You can now view and download portions of your medical record. 10. Click the Download Summary menu link to download a portable copy of your medical information. If you have questions, please visit the Frequently Asked Questions section of the [employer] website. Remember, [employer] is NOT to be used for urgent needs. For medical emergencies, dial 911. Now available from your iPhone and Android! Please provide this summary of care documentation to your next provider. Your primary care clinician is listed as Kalie DELA CRUZ. If you have any questions after today's visit, please call [phone].

## 2017-06-14 NOTE — PROGRESS NOTES
Alexa Brito is a 29 y.o. female here for   Chief Complaint   Patient presents with    Thyroid Problem       1. Have you been to the ER, urgent care clinic since your last visit? Hospitalized since your last visit? - no    2. Have you seen or consulted any other health care providers outside of the [address] [place] [address] since your last visit?   Include any pap smears or colon screening.-Dr. Mark Geller, PCP    Wt Readings from Last 3 Encounters:   04/21/17 168 lb (76.2 kg)   04/15/17 168 lb (76.2 kg)   12/15/16 176 lb (79.8 kg)     Temp Readings from Last 3 Encounters:   04/21/17 98 °F (36.7 °C) (Oral)   04/15/17 98.4 °F (36.9 °C)   12/15/16 97.4 °F (36.3 °C) (Oral)     BP Readings from Last 3 Encounters:   04/21/17 119/73   04/15/17 134/63   12/15/16 107/70     Pulse Readings from Last 3 Encounters:   04/21/17 84   04/15/17 98   12/15/16 67

## 2017-06-14 NOTE — PROGRESS NOTES
Chief Complaint   Patient presents with    Thyroid Problem       History of present illness    Kiel Dias is a 29 y.o. female  here for fu of hyperthyroidism. Graves disease - on Methimazole  Compliant with MMI    +  weight gain , works full time  On [address] [place] [address]   She is not pregnant  Has insurance now   Works at [employer] - no decrease in size    No dysphagia,dysphonia or dyspnea. Graves Dx in 2009 when she was in [facility], treated for a year and it recurred a year later, then she was Rx with PTU till 5/13, she was changed to [address] [place] [address]    DM family   No FH of thyroid disease. No FH of thyroid cancer     Wt Readings from Last 3 Encounters:   06/14/17 172 lb 6.4 oz (78.2 kg)   04/21/17 168 lb (76.2 kg)   04/15/17 168 lb (76.2 kg)       Past Medical History:   Diagnosis Date    Endocrine disease     hypothyroid    Goiter     Graves disease     Prediabetes     Retinal detachment        Current Outpatient Prescriptions   Medication Sig    FALMINA, 28, 0.1-20 mg-mcg tab     methIMAzole (TAPAZOLE) 5 mg tablet 1 tab 5 mg tab daily alternating with 2.5 mg     No current facility-administered medications for this visit. Review of Systems:  - Constitutional Symptoms: no fevers, chills, weight loss  - Eyes: no blurry vision or double vision  - Cardiovascular: no chest pain or palpitations  - Respiratory: no cough or shortness of breath  - Gastrointestinal: no dysphagia or abdominal pain  - Musculoskeletal: + joint pains or weakness  - Integumentary: no rashes  -     Physical Examination:  - Blood pressure 111/54, pulse 64, temperature 98.6 °F (37 °C), temperature source Oral, resp. rate 16, height 5' 5\" (1.651 m), weight 172 lb 6.4 oz (78.2 kg), SpO2 100 %. Body mass index is 28.69 kg/(m^2).   - General: pleasant, no distress, good eye contact  - HEENT: no exopthalmos, no periorbital edema, no scleral/conjunctival injection, EOMI, no lid lag or stare  - Neck: supple, thyromegaly 2 times the size - Cardiovascular: regular, normal rate, normal S1 and S2  - Respiratory: clear to auscultation bilaterally  - Gastrointestinal: soft, nontender, nondistended, BS +  - Musculoskeletal: no tremors, no edema  - Neurological: alert and oriented   - Psychiatric: normal mood and affect  - Skin - normal turgor    Data Reviewed:      Ref. Range 11/8/2012 22:15 12/11/2012 10:31 5/16/2013 08:55   T4, Free Latest Range: 0.82-1.77 ng/dL 2.1 (H) 1.0 0.73 (L)   T3, total Latest Range:  ng/dL   119   TSH Latest Range: 0.450-4.500 uIU/mL <0.01 (L) <0.01 (L) 4.230       Lab Results   Component Value Date/Time    WBC 6.6 04/21/2017 10:48 AM    HGB 11.0 04/21/2017 10:48 AM    HCT 33.8 04/21/2017 10:48 AM    PLATELET 132 32/74/6614 10:48 AM    MCV 78 04/21/2017 10:48 AM     Lab Results   Component Value Date/Time    AST (SGOT) 10 04/21/2017 10:48 AM    Alk. phosphatase 39 04/21/2017 10:48 AM     Lab Results   Component Value Date/Time    TSH 4.120 06/06/2017 02:54 AM    T4, Free 0.81 06/06/2017 02:54 AM        [x] Reviewed labs    Assessment/Plan:     1. Grave's disease - stopped MMI 12/16, relapsed and hence restarted   2. Goiter - US 7/15 no nodules  3. Hx of retinal detachment -   4. [address]  5. Prediabetes for A1 C-lifestyle changes     Lab Results   Component Value Date/Time    Hemoglobin A1c 5.9 04/21/2017 10:48 AM    Hemoglobin A1c (POC) 5.2 07/28/2015 02:30 PM       Methimazole 5 mg daily -alternating 2.5 mg   She  knows to call us if she gets pregnant. Given the size of the gland and TRab she needs medication longer. Not interested in ANDERSEN, discussed again   MOnitor labs in 2 months     Thank you for allowing me to participate in the care of this patient.     Mohsen Garcia MD

## 2017-08-03 ENCOUNTER — OFFICE VISIT (OUTPATIENT)
Dept: INTERNAL MEDICINE CLINIC | Age: 32
End: 2017-08-03

## 2017-08-03 VITALS
HEART RATE: 82 BPM | HEIGHT: 65 IN | RESPIRATION RATE: 16 BRPM | TEMPERATURE: 98.1 F | BODY MASS INDEX: 27.99 KG/M2 | WEIGHT: 168 LBS | DIASTOLIC BLOOD PRESSURE: 73 MMHG | OXYGEN SATURATION: 100 % | SYSTOLIC BLOOD PRESSURE: 115 MMHG

## 2017-08-03 DIAGNOSIS — Z20.821 EXPOSURE TO ZIKA VIRUS: Primary | ICD-10-CM

## 2017-08-03 DIAGNOSIS — R73.03 PREDIABETES: ICD-10-CM

## 2017-08-03 DIAGNOSIS — D64.9 ANEMIA, UNSPECIFIED TYPE: ICD-10-CM

## 2017-08-03 DIAGNOSIS — Z3A.01 LESS THAN 8 WEEKS GESTATION OF PREGNANCY: ICD-10-CM

## 2017-08-03 DIAGNOSIS — R50.9 FEVER, UNSPECIFIED FEVER CAUSE: ICD-10-CM

## 2017-08-03 NOTE — PROGRESS NOTES
Chief Complaint   Patient presents with    Diabetes     Prediabetes    Follow-up     3 month follow up     she is a 29y.o. year old female who presents for follow-up of prediabetes    Diabetes - Not currently on a medication regimen for diabetes. [place]     is not checking BS at home. denies hypoglycemia symptoms. denies neuropathy symptoms. Last eye exam about 6 month ago, [facility]. Last foot exam -  No Last Foot Exam.    Provider:   Gwyn:  Diabetes Report Card   1) Have you seen the eye doctor in past year?no    2) How would you  rate your Diabetic Diet?good   3) How well do you take care of your feet?well   4) Do you keep your Primary Care Follow Up Appts? yes    5) Do you know your A1C goal?no    6) Do you take your medications daily?na    7) Do you check your blood sugars?na    8) Have you gained weight?yes    9) Have you lost weight?no    10) Do you follow an exercise program?no    11) Can you do better? no            Lab Results   Component Value Date/Time    Hemoglobin A1c 5.9 04/21/2017 10:48 AM     Lab Results   Component Value Date/Time    Cholesterol, total 167 04/21/2017 10:48 AM    HDL Cholesterol 38 04/21/2017 10:48 AM    LDL, calculated 105 04/21/2017 10:48 AM    Triglyceride 121 04/21/2017 10:48 AM     No results found for: MCACR, MCA1, MCA2, MCA3, MCAU      Reviewed and agree with Nurse Note and duplicated in this note. Reviewed PmHx, RxHx, FmHx, SocHx, AllgHx and updated and dated in the chart.     Family History   Problem Relation Age of Onset    Diabetes Father        Past Medical History:   Diagnosis Date    Endocrine disease     hypothyroid    Goiter     Graves disease     Prediabetes     Retinal detachment       Social History     Social History    Marital status:      Spouse name: N/A    Number of children: N/A    Years of education: N/A     Social History Main Topics    Smoking status: Never Smoker    Smokeless tobacco: Never Used    Alcohol use No    Drug use: No    Sexual activity: Yes     Partners: Male     Birth control/ protection: None     Other Topics Concern    None     Social History Narrative        Review of Systems - negative except as listed above      Objective:     Vitals:    08/03/17 1004   BP: 115/73   Pulse: 82   Resp: 16   Temp: 98.1 °F (36.7 °C)   TempSrc: Oral   SpO2: 100%   Weight: 168 lb (76.2 kg)   Height: 5' 5\" (1.651 m)       Physical Examination: General appearance - alert, well appearing, and in no distress  Eyes - pupils equal and reactive, extraocular eye movements intact  Ears - bilateral TM's and external ear canals normal  Nose - normal and patent, no erythema, discharge or polyps  Mouth - mucous membranes moist, pharynx normal without lesions  Neck - supple, no significant adenopathy  Chest - clear to auscultation, no wheezes, rales or rhonchi, symmetric air entry  Heart - normal rate, regular rhythm, normal S1, S2, no murmurs, rubs, clicks or gallops  Abdomen - soft, nontender, nondistended, no masses or organomegaly  Back exam - full range of motion, no tenderness, palpable spasm or pain on motion  Neurological - alert, oriented, normal speech, no focal findings or movement disorder noted  Musculoskeletal - no joint tenderness, deformity or swelling  Extremities - peripheral pulses normal, no pedal edema, no clubbing or cyanosis  Skin - normal coloration and turgor, no rashes, no suspicious skin lesions noted    Assessment/ Plan:   Diagnoses and all orders for this visit:    1. Exposure to Zika virus  -     ZIKA VIRUS, SHAMIKA, COMPREHENSIVE  -     ZIKA VIRUS, SHAMIKA, COMPREHENSIVE    2. Anemia, unspecified type  -     CBC W/O DIFF    3. Prediabetes  -     HEMOGLOBIN A1C WITH EAG    4. Fever, unspecified fever cause    5.  Less than 8 weeks gestation of pregnancy  -     REFERRAL TO OBSTETRICS AND GYNECOLOGY       Follow-up Disposition: Not on File    I have discussed the diagnosis with the patient and the intended plan as seen in the above orders. The patient has received an after-visit summary and questions were answered concerning future plans. Medication Side Effects and Warnings were discussed with patient: yes  Patient Labs were reviewed and or requested: yes  Patient Past Records were reviewed and or requested  yes  I have discussed the diagnosis with the patient and the intended plan as seen in the above orders. The patient has received an after-visit summary and questions were answered concerning future plans. Pt agrees to call or return to clinic and/or go to closest ER with any worsening of symptoms. This may include, but not limited to increased fever (>100.4) with NSAIDS or Tylenol, increased edema, confusion, rash, worsening of presenting symptoms. Patient was informed/counseled to:    1) Remember to stay active and/or exercise regularly (I suggest 30-45 minutes daily)   2) For reliable dietary information, go to www. EATRIGHT.org. You may wish to consider seeing the nutritionist at [facility] at #[phone] or [phone], also consider the [phone] [facility].   3) I routinely suggest a complete physical exam once each year (your birth month)

## 2017-08-03 NOTE — MR AVS SNAPSHOT
Visit Information Date & Time Provider Department Dept. Phone Encounter #  
 8/3/2017 10:00 AM [facility] Bonnie Becerra [facility] Sports Medicine and [address] [id number] Follow-up Instructions Return if symptoms worsen or fail to improve. Follow-up and Disposition History Your Appointments 12/15/2017  1:30 PM  
ROUTINE CARE with Henok Clay MD  
Care Diabetes & Endocrinology [facility]) Appt Note: f/u 6 month  
 [address] [facility] [address]  
[phone]  
  
   
 [address] [place] [id number] Upcoming Health Maintenance Date Due DTaP/Tdap/Td series (1 - Tdap) 1/18/2008 INFLUENZA AGE 9 TO ADULT 8/1/2017 PAP AKA CERVICAL CYTOLOGY 11/3/2019 Allergies as of 8/3/2017  Review Complete On: 8/3/2017 By: [facility] MD Mariam  
  
 Severity Noted Reaction Type Reactions Ibuprofen  03/24/2014    Swelling Eyes Current Immunizations  Reviewed on 6/24/2013 Name Date  
 TB Skin Test (PPD) Intradermal 6/24/2013 Not reviewed this visit You Were Diagnosed With   
  
 Codes Comments Exposure to [address] virus    -  Primary ICD-10-CM: Z20.828 ICD-9-CM: V01.79 Anemia, unspecified type     ICD-10-CM: D64.9 ICD-9-CM: 285.9 Prediabetes     ICD-10-CM: R73.03 
ICD-9-CM: 790.29 Fever, unspecified fever cause     ICD-10-CM: R50.9 ICD-9-CM: 780.60 Less than 8 weeks gestation of pregnancy     ICD-10-CM: Z3A.01 
ICD-9-CM: V22.2 Vitals BP Pulse Temp Resp Height(growth percentile) Weight(growth percentile) 115/73 82 98.1 °F (36.7 °C) (Oral) 16 5' 5\" (1.651 m) 168 lb (76.2 kg) LMP SpO2 BMI OB Status Smoking Status 04/04/2017 100% 27.96 kg/m2 Pregnant Never Smoker Vitals History BMI and BSA Data Body Mass Index Body Surface Area  
 27.96 kg/m 2 1.87 m 2 Preferred Pharmacy Pharmacy Name Phone [id number] Alex Bhat, [address] Yogesh Conley [facility] [phone] Your Updated Medication List  
  
   
This list is accurate as of: 8/3/17 10:53 AM.  Always use your most recent med list.  
  
  
  
  
 Linda Juan (30) 0.1-20 mg-mcg Tab Generic drug:  levonorgestrel-ethinyl estradiol  
  
 methIMAzole 5 mg tablet Commonly known as:  TAPAZOLE  
1 tab 5 mg tab daily alternating with 2.5 mg We Performed the Following CBC W/O DIFF [87320 CPT(R)] HEMOGLOBIN A1C WITH EAG [11355 CPT(R)] REFERRAL TO OBSTETRICS AND GYNECOLOGY [REF51 Custom] Comments:  
 Please evaluate patient for pregnancy ZIKA VIRUS, SHAMIKA, COMPREHENSIVE [[id number] Custom] ZIKA VIRUS, SHAMIKA, COMPREHENSIVE [[id number] Custom] Follow-up Instructions Return if symptoms worsen or fail to improve. Referral Information Referral ID Referred By Referred To  
  
 [id number] Nino Camacho, 1190 [facility] [address] [address] [address] Visits Status Start Date End Date 1 New Request 8/3/17 8/3/18 If your referral has a status of pending review or denied, additional information will be sent to support the outcome of this decision. Introducing hospitals & HEALTH SERVICES! Debo Diana introduces [employer] patient portal. Now you can access parts of your medical record, email your doctor's office, and request medication refills online. 1. In your internet browser, go to [email or web address] 2. Click on the First Time User? Click Here link in the Sign In box. You will see the New Member Sign Up page. 3. Enter your [employer] Access Code exactly as it appears below. You will not need to use this code after youve completed the sign-up process. If you do not sign up before the expiration date, you must request a new code. · [employer] Access Code: [email or web address] Expires: 11/1/2017 10:53 AM 
 
 4. Enter the last four digits of your Social Security Number (xxxx) and Date of Birth (mm/dd/yyyy) as indicated and click Submit. You will be taken to the next sign-up page. 5. Create a [employer] ID. This will be your [employer] login ID and cannot be changed, so think of one that is secure and easy to remember. 6. Create a [employer] password. You can change your password at any time. 7. Enter your Password Reset Question and Answer. This can be used at a later time if you forget your password. 8. Enter your e-mail address. You will receive e-mail notification when new information is available in [address] 9. Click Sign Up. You can now view and download portions of your medical record. 10. Click the Download Summary menu link to download a portable copy of your medical information. If you have questions, please visit the Frequently Asked Questions section of the [employer] website. Remember, [employer] is NOT to be used for urgent needs. For medical emergencies, dial 911. Now available from your iPhone and Android! Please provide this summary of care documentation to your next provider. Your primary care clinician is listed as Michael DELA CRUZ. If you have any questions after today's visit, please call [phone].

## 2017-08-08 DIAGNOSIS — E05.00 GRAVES' DISEASE: Primary | ICD-10-CM

## 2017-08-08 RX ORDER — PROPYLTHIOURACIL 50 MG/1
25 TABLET ORAL 2 TIMES DAILY
Qty: 30 TAB | Refills: 6 | Status: SHIPPED | OUTPATIENT
Start: 2017-08-08 | End: 2017-09-07

## 2017-08-10 LAB
ERYTHROCYTE [DISTWIDTH] IN BLOOD BY AUTOMATED COUNT: 14.4 % (ref 12.3–15.4)
EST. AVERAGE GLUCOSE BLD GHB EST-MCNC: 108 MG/DL
HBA1C MFR BLD: 5.4 % (ref 4.8–5.6)
HCT VFR BLD AUTO: 39.5 % (ref 34–46.6)
HGB BLD-MCNC: 12.6 G/DL (ref 11.1–15.9)
MCH RBC QN AUTO: 25.9 PG (ref 26.6–33)
MCHC RBC AUTO-ENTMCNC: 31.9 G/DL (ref 31.5–35.7)
MCV RBC AUTO: 81 FL (ref 79–97)
PLATELET # BLD AUTO: 403 X10E3/UL (ref 150–379)
RBC # BLD AUTO: 4.87 X10E6/UL (ref 3.77–5.28)
WBC # BLD AUTO: 10.7 X10E3/UL (ref 3.4–10.8)
ZIKA VIRUS, NAA, SERUM: NORMAL
ZIKA VIRUS, NAA, SERUM: NORMAL
ZIKA VIRUS, NAA, URINE: NEGATIVE
ZIKA VIRUS, NAA, URINE: NORMAL

## 2017-08-15 ENCOUNTER — OFFICE VISIT (OUTPATIENT)
Dept: OBGYN CLINIC | Age: 32
End: 2017-08-15

## 2017-08-15 VITALS
WEIGHT: 168 LBS | DIASTOLIC BLOOD PRESSURE: 66 MMHG | SYSTOLIC BLOOD PRESSURE: 112 MMHG | BODY MASS INDEX: 32.98 KG/M2 | HEIGHT: 60 IN

## 2017-08-15 DIAGNOSIS — Z34.01 ENCOUNTER FOR SUPERVISION OF NORMAL FIRST PREGNANCY IN FIRST TRIMESTER: Primary | ICD-10-CM

## 2017-08-15 DIAGNOSIS — N92.6 MISSED MENSES: ICD-10-CM

## 2017-08-15 DIAGNOSIS — Z20.821 EXPOSURE TO ZIKA VIRUS: ICD-10-CM

## 2017-08-15 DIAGNOSIS — Z32.01 POSITIVE PREGNANCY TEST: ICD-10-CM

## 2017-08-15 PROBLEM — Z34.00 SUPERVISION OF NORMAL FIRST PREGNANCY: Status: ACTIVE | Noted: 2017-08-15

## 2017-08-15 NOTE — PROGRESS NOTES
Current pregnancy history:    Edmund Gomez is a ,  29 y.o. female [facility] Patient's last menstrual period was 2017 (exact date). .  She presents for the evaluation of amenorrhea and a positive pregnancy test.    LMP history:  The date of her LMP is  certain. Her last menstrual period was normal and lasted for 4 to 5 days. A urine pregnancy test was positive  weeks ago. She was not on the pill at conception. Based on her LMP, her EDC is 18 and her EGA is 6 weeks,6 days. Her menstrual cycles are regular and occur approximately every 28 days  and range from 3 to 5 days. The last menses lasted  the usual number of days. Ultrasound data:  She had an  ultrasound done by the ultrasound tech today which revealed a viable lundberg pregnancy with a gestational age of 7 weeks and 1 days giving an [facility] of 18. Pregnancy symptoms:    Since her LMP she has experienced  urinary frequency, breast tenderness, and nausea. She has not been vomiting over the last few weeks. Associated signs and symptoms which she denies: dysuria, discharge, vaginal bleeding. She states she has gained weight:  Approximately 5 pounds over the last few weeks. Relevant past pregnancy history:   She has the following pregnancy history: Her last pregnancy was uncomplicated. She has no history of  delivery. Relevant past medical history:(relevant to this pregnancy): noncontributory. Pap/Occupational history:  Last pap smear: last year Results: Normal      Substance history: negative for alcohol, tobacco and street drugs. Positive for nothing. Exposure history: There is/are no indoor cat/s in the home. The patient was instructed to not change the cat litter. She admits close contact with children on a regular basis. She has had chicken pox or the vaccine in the past.   Patient denies issues with domestic violence.      Genetic Screening/Teratology Counseling: (Includes patient, baby's father, or anyone in either family with:)  3.  Patient's age >/= 28 at [facility]?-- no  .   2. Thalassemia ([facility] [place], [address], or  background): MCV<80?--no.     3.  Neural tube defect (meningomyelocele, spina bifida, anencephaly)?--no.   4.  Congenital heart defect?--no.  5.  Down syndrome?--no.   6.  Jhonathan-Sachs (Yazdanism, [place])?--no.   7.  Canavan's Disease?--no.   8.  Familial Dysautonomia?--no.   9.  Sickle cell disease or trait ()? --no   The patient has not been tested for sickle trait  10. Hemophilia or other blood disorders?--no. 11.  Muscular dystrophy?--no. 12.  Cystic fibrosis?--no. 13.  Granite's Chorea?--no. 14.  Mental retardation/autism (if yes was person tested for Fragile X)?--no. 15.  Other inherited genetic or chromosomal disorder?--no. 12.  Maternal metabolic disorder (DM, PKU, etc)?--no. 17.  Patient or FOB with a child with a birth defect not listed above?--no.  17a. Patient or FOB with a birth defect themselves?--no. 18.  Recurrent pregnancy loss, or stillbirth?--no. 19.  Any medications since LMP other than prenatal vitamins (include vitamins, supplements, OTC meds, drugs, alcohol)?--no. 20.  Any other genetic/environmental exposure to discuss?--no. Infection History:  1. Lives with someone with TB or TB exposed?--no.   2.  Patient or partner has history of genital herpes?--no.  3.  Rash or viral illness since LMP?--no.    4.  History of STD (GC, CT, HPV, syphilis, HIV)? --no   5. Other: OTHER? Past Medical History:   Diagnosis Date    Endocrine disease     hypothyroid    Goiter     Graves disease     Hypothyroid     Prediabetes     Retinal detachment     Routine Papanicolaou smear 10/31/2016    Negative (No ECC) No HPV      Past Surgical History:   Procedure Laterality Date    HX HEENT      jaw surgery    HX TUMOR REMOVAL      facial tumor removed     Social History     Occupational History    Not on file. Social History Main Topics    Smoking status: Never Smoker    Smokeless tobacco: Never Used      Comment: Never used vapor or e-cigs     Alcohol use No    Drug use: No    Sexual activity: Yes     Partners: Male     Birth control/ protection: None     Family History   Problem Relation Age of Onset    Diabetes Father      OB History    Para Term  AB Living   1 0 0 0 0 0   SAB TAB Ectopic Molar Multiple Live Births   0 0 0  0       # Outcome Date GA Lbr Franki/2nd Weight Sex Delivery Anes PTL Lv   1 Current                 Allergies   Allergen Reactions    Ibuprofen Swelling     Eyes       Prior to Admission medications    Medication Sig Start Date End Date Taking? Authorizing Provider   propylthiouracil (PTU) 50 mg tablet Take 0.5 Tabs by mouth two (2) times a day for 30 days.  17  Giovany Saldaña MD   [address] , , 0.1-20 mg-mcg tab  16   Historical Provider        Review of Systems: History obtained from the patient  Constitutional: negative for weight loss, fever, night sweats  HEENT: negative for hearing loss, earache, congestion, snoring, sore throat  CV: negative for chest pain, palpitations, edema  Resp: negative for cough, shortness of breath, wheezing  Breast: negative for breast lumps, nipple discharge, galactorrhea  GI: negative for change in bowel habits, abdominal pain, black or bloody stools  : negative for frequency, dysuria, hematuria, vaginal discharge  MSK: negative for back pain, joint pain, muscle pain  Skin: negative for itching, rash, hives  Neuro: negative for dizziness, headache, confusion, weakness  Psych: negative for anxiety, depression, change in mood  Heme/lymph: negative for bleeding, bruising, pallor    Objective:  Visit Vitals    /66    Ht 5' (1.524 m)    Wt 168 lb (76.2 kg)    LMP 2017 (Exact Date)    BMI 32.81 kg/m2       Physical Exam:     Constitutional  · Appearance: well-nourished, well developed, alert, in no acute distress    HENT  · Head  · Face: appears normal  · Eyes: appear normal  · Ears: normal  · Mouth: normal  · Lips: no lesions    Neck  · Inspection/Palpation: normal appearance, no masses or tenderness  · Lymph Nodes: no lymphadenopathy present  · Thyroid: gland size normal, nontender, no nodules or masses present on palpation    Chest  · Respiratory Effort: breathing unlabored    Gastrointestinal  · Abdominal Examination: abdomen non-tender to palpation, normal bowel sounds, no masses present  · Liver and spleen: no hepatomegaly present, spleen not palpable  · Hernias: no hernias identified    Genitourinary  · External Genitalia: normal appearance for age, no discharge present, no tenderness present, no inflammatory lesions present, no masses present, no atrophy present  · Vagina: normal vaginal vault without central or paravaginal defects, no discharge present, no inflammatory lesions present, no masses present  · Bladder: non-tender to palpation  · Urethra: appears normal  · Cervix: normal   · Uterus: enlarged, normal shape, soft  · Adnexa: no adnexal tenderness present, no adnexal masses present  · Perineum: perineum within normal limits, no evidence of trauma, no rashes or skin lesions present  · Anus: anus within normal limits, no hemorrhoids present  · Inguinal Lymph Nodes: no lymphadenopathy present    Skin  · General Inspection: no rash, no lesions identified    Neurologic/Psychiatric  · Mental Status:  · Orientation: grossly oriented to person, place and time  · Mood and Affect: mood normal, affect appropriate    Assessment:   Intrauterine pregnancy with the following problems identified: h/o hyperthyroidism- currently on PTU, will check thyroid hormones today, followed by endocrine, PNC referral.  Maternal screen at next visit. . Travel to [facility] 1 month ago, will send ZIKA testing.     Plan:     Offered CF testing, CVS, Nuchal Translucency, MSAFP, amnio, and discussed NIPT  Course of pregnancy discussed including visit schedule, routine U/S, glucola testing, etc.  Avoid alcoholic beverages and illicit/recreational drugs use  Take prenatal vitamins or folic acid daily. Hospital and practice style discussed with coverage system. Discussed nutrition, toxoplasmosis precautions, sexual activity, exercise, need for influenza vaccine, environmental and work hazards, travel advice, screen for domestic violence, need for seat belts. Discussed seafood, unpasteurized dairy products, deli meat, artificial sweeteners, and caffeine. Information on prenatal classes/breastfeeding given. Information on circumcision given  Patient encouraged not to smoke. Discussed current prescription drug use. Given medication list.  Discussed the use of over the counter medications and chemicals. Route of delivery discussed, including risks, benefits, and alternatives of  versus repeat LTCS. Pt understands risk of hemorrhage during pregnancy and post delivery and would accept blood products if necessary in life-threatening emergencies    Handouts given to pt.

## 2017-08-15 NOTE — PATIENT INSTRUCTIONS
Weeks 6 to 10 of Your Pregnancy: Care Instructions  Your Care Instructions    Congratulations on your pregnancy. This is an exciting and important time for you. During the first 6 to 10 weeks of your pregnancy, your body goes through many changes. Your baby grows very fast, even though you cannot feel it yet. You may start to notice that you feel different, both in your body and your emotions. Because each woman's pregnancy is unique, there is no right way to feel. You may feel the healthiest you have ever been, or you may feel tired or sick to your stomach (\"morning sickness\"). These early weeks are a time to make healthy choices and to eat the best foods for you and your baby. This care sheet will give you some ideas. This is also a good time to think about birth defects testing. These are tests done during pregnancy to look for possible problems with the baby. First trimester tests for birth defects can be done between 8 and 17 weeks of pregnancy, depending on the test. Talk with your doctor about what kinds of tests are available. Follow-up care is a key part of your treatment and safety. Be sure to make and go to all appointments, and call your doctor if you are having problems. It's also a good idea to know your test results and keep a list of the medicines you take. How can you care for yourself at home? Eat well  · Eat at least 3 meals and 2 healthy snacks every day. Eat fresh, whole foods, including:  ¨ 7 or more servings of bread, tortillas, cereal, rice, pasta, or oatmeal.  ¨ 3 or more servings of vegetables, especially leafy green vegetables. ¨ 2 or more servings of fruits. ¨ 3 or more servings of milk, yogurt, or cheese. ¨ 2 or more servings of meat, turkey, chicken, fish, eggs, or dried beans. · Drink plenty of fluids, especially water. Avoid sodas and other sweetened drinks. · Choose foods that have important vitamins for your baby, such as calcium, iron, and folate.   ¨ Dairy products, tofu, canned fish with bones, almonds, broccoli, dark leafy greens, corn tortillas, and fortified orange juice are good sources of calcium. ¨ Beef, poultry, liver, spinach, lentils, dried beans, fortified cereals, and dried fruits are rich in iron. ¨ Dark leafy greens, broccoli, asparagus, liver, fortified cereals, orange juice, peanuts, and almonds are good sources of folate. · Avoid foods that could harm your baby. ¨ Do not eat raw or undercooked meat, chicken, or fish (such as sushi or raw oysters). ¨ Do not eat raw eggs or foods that contain raw eggs, such as Caesar dressing. ¨ Do not eat soft cheeses and unpasteurized dairy foods, such as Brie, feta, or blue cheese. ¨ Do not eat fish that contains a lot of mercury, such as shark, swordfish, tilefish, or yoana mackerel. Do not eat more than 6 ounces of tuna each week. ¨ Do not eat raw sprouts, especially alfalfa sprouts. ¨ Cut down on caffeine, such as coffee, tea, and cola. Protect yourself and your baby  · Do not touch shemar litter or cat feces. They can cause an infection that could harm your baby. · High body temperature can be harmful to your baby. So if you want to use a sauna or hot tub, be sure to talk to your doctor about how to use it safely. [place] with morning sickness  · Sip small amounts of water, juices, or shakes. Try drinking between meals, not with meals. · Eat 5 or 6 small meals a day. Try dry toast or crackers when you first get up, and eat breakfast a little later. · Avoid spicy, greasy, and fatty foods. · When you feel sick, open your windows or go for a short walk to get fresh air. · Try nausea wristbands. These help some women. · Tell your doctor if you think your prenatal vitamins make you sick. Where can you learn more? Go to http://nicki.info/. Enter G112 in the search box to learn more about \"Weeks 6 to 10 of Your Pregnancy: Care Instructions. \"  Current as of: March 16, 2017  Content Version: 11.3  © 8447-9191 [employer]. Care instructions adapted under license by [employer] (which disclaims liability or warranty for this information). If you have questions about a medical condition or this instruction, always ask your healthcare professional. [address] any warranty or liability for your use of this information.

## 2017-08-18 ENCOUNTER — HOSPITAL ENCOUNTER (EMERGENCY)
Age: 32
Discharge: HOME | End: 2017-08-18
Attending: FAMILY MEDICINE

## 2017-08-18 ENCOUNTER — HOSPITAL ENCOUNTER (OUTPATIENT)
Dept: LAB | Age: 32
Discharge: HOME | End: 2017-08-18

## 2017-08-18 VITALS
BODY MASS INDEX: 28.16 KG/M2 | HEIGHT: 65 IN | WEIGHT: 169 LBS | OXYGEN SATURATION: 97 % | DIASTOLIC BLOOD PRESSURE: 51 MMHG | SYSTOLIC BLOOD PRESSURE: 103 MMHG | RESPIRATION RATE: 18 BRPM | HEART RATE: 75 BPM | TEMPERATURE: 97.9 F

## 2017-08-18 DIAGNOSIS — N89.8 VAGINAL DISCHARGE: ICD-10-CM

## 2017-08-18 DIAGNOSIS — R21 RASH OF GENITALIA: Primary | ICD-10-CM

## 2017-08-18 LAB
BACTERIA UR CULT: NO GROWTH
C TRACH RRNA SPEC QL NAA+PROBE: NEGATIVE
CLUE CELLS VAG QL WET PREP: NORMAL
KOH PREP SPEC: NORMAL
N GONORRHOEA RRNA SPEC QL NAA+PROBE: NEGATIVE
SERVICE CMNT-IMP: NORMAL
T VAGINALIS RRNA SPEC QL NAA+PROBE: NEGATIVE
T VAGINALIS VAG QL WET PREP: NORMAL

## 2017-08-18 PROCEDURE — 87210 SMEAR WET MOUNT SALINE/INK: CPT | Performed by: FAMILY MEDICINE

## 2017-08-18 PROCEDURE — 99214 OFFICE O/P EST MOD 30 MIN: CPT | Performed by: FAMILY MEDICINE

## 2017-08-18 RX ORDER — CEPHALEXIN 500 MG/1
500 CAPSULE ORAL 2 TIMES DAILY
Qty: 14 CAP | Refills: 0 | Status: SHIPPED | OUTPATIENT
Start: 2017-08-18 | End: 2017-08-25

## 2017-08-18 NOTE — UC PROVIDER NOTE
Patient is a 29 y.o. female presenting with rash. The history is provided by the patient. Rash    This is a new problem. The current episode started 2 days ago (in vaginal region after wearing panty liners for vaginal discharge). The problem has been gradually worsening. There has been no fever. The rash is present on the genitalia. The pain is at a severity of 4/10. The pain has been constant since onset. Associated symptoms include pain. Risk factors: no hx of hsv. She has tried nothing for the symptoms. Past Medical History:   Diagnosis Date    Endocrine disease     hypothyroid    Goiter     Graves disease     Hypothyroid     Prediabetes     Retinal detachment     Routine Papanicolaou smear 10/31/2016    Negative (No ECC) No HPV         Past Surgical History:   Procedure Laterality Date    HX HEENT      jaw surgery    HX TUMOR REMOVAL      facial tumor removed         Family History   Problem Relation Age of Onset    Diabetes Father         Social History     Social History    Marital status:      Spouse name: N/A    Number of children: N/A    Years of education: N/A     Occupational History    Not on file. Social History Main Topics    Smoking status: Never Smoker    Smokeless tobacco: Never Used      Comment: Never used vapor or e-cigs     Alcohol use No    Drug use: No    Sexual activity: Yes     Partners: Male     Birth control/ protection: None     Other Topics Concern    Not on file     Social History Narrative                ALLERGIES: Ibuprofen    Review of Systems   Constitutional: Negative for chills and fever. Respiratory: Negative for shortness of breath and wheezing. Cardiovascular: Negative for chest pain and palpitations. Gastrointestinal: Negative for nausea and vomiting. Skin: Positive for rash. Neurological: Negative for dizziness and headaches.        Vitals:    08/18/17 1611   BP: 103/51   Pulse: 75   Resp: 18   Temp: 97.9 °F (36.6 °C)   SpO2: 97%   Weight: 76.7 kg (169 lb)   Height: 5' 5\" (1.651 m)       Physical Exam   Constitutional: She appears well-developed and well-nourished. No distress. Genitourinary:       There is rash and tenderness on the right labia. There is rash and tenderness on the left labia. Vaginal discharge (clear) found. Neurological: She is alert. Skin: She is not diaphoretic. Psychiatric: She has a normal mood and affect. Her behavior is normal. Judgment and thought content normal.   Nursing note and vitals reviewed. MDM     Differential Diagnosis; Clinical Impression; Plan:     CLINICAL IMPRESSION:  Rash of genitalia  (primary encounter diagnosis) - contact dermatitis vs cellulits vs hsv (no vesicles noted)  Vaginal discharge    Plan:  1. keflex  2. KOH/Wet  3. Avoid wearing panty liners  4. F/u with ob  Amount and/or Complexity of Data Reviewed:   Clinical lab tests:  Ordered  Risk of Significant Complications, Morbidity, and/or Mortality:   Presenting problems: Moderate  Diagnostic procedures: Moderate  Management options:   Moderate  Progress:   Patient progress:  Stable      Procedures

## 2017-08-19 LAB
CYTOLOGIST CVX/VAG CYTO: ABNORMAL
CYTOLOGY CVX/VAG DOC THIN PREP: ABNORMAL
DX ICD CODE: ABNORMAL
DX ICD CODE: ABNORMAL
HPV I/H RISK 1 DNA CVX QL PROBE+SIG AMP: NEGATIVE
Lab: ABNORMAL
OTHER STN SPEC: ABNORMAL
PATH REPORT.FINAL DX SPEC: ABNORMAL
PATHOLOGIST CVX/VAG CYTO: ABNORMAL
STAT OF ADQ CVX/VAG CYTO-IMP: ABNORMAL

## 2017-08-19 RX ORDER — METRONIDAZOLE 500 MG/1
500 TABLET ORAL 2 TIMES DAILY
Qty: 14 TAB | Refills: 0 | Status: SHIPPED | OUTPATIENT
Start: 2017-08-19 | End: 2017-08-26

## 2017-08-21 LAB
ABO GROUP BLD: NORMAL
ANTIBODY SCREEN, EXTERNAL: NEGATIVE
BLD GP AB SCN SERPL QL: NEGATIVE
CFTR MUT ANL BLD/T: NORMAL
ERYTHROCYTE [DISTWIDTH] IN BLOOD BY AUTOMATED COUNT: 14.1 % (ref 12.3–15.4)
GENE DIS ANL CARRIER INTERP-IMP: NORMAL
HBSAG, EXTERNAL: NEGATIVE
HBV SURFACE AG SERPL QL IA: NEGATIVE
HCT VFR BLD AUTO: 36.1 % (ref 34–46.6)
HGB A MFR BLD: 97.2 % (ref 94–98)
HGB A2 MFR BLD COLUMN CHROM: 2.8 % (ref 0.7–3.1)
HGB BLD-MCNC: 11.6 G/DL (ref 11.1–15.9)
HGB C MFR BLD: 0 %
HGB F MFR BLD: 0 % (ref 0–2)
HGB FRACT BLD-IMP: NORMAL
HGB S BLD QL SOLY: NEGATIVE
HGB S MFR BLD: 0 %
HIV 1+2 AB+HIV1 P24 AG SERPL QL IA: NON REACTIVE
HIV, EXTERNAL: NON REACTIVE
MCH RBC QN AUTO: 25.6 PG (ref 26.6–33)
MCHC RBC AUTO-ENTMCNC: 32.1 G/DL (ref 31.5–35.7)
MCV RBC AUTO: 80 FL (ref 79–97)
PLATELET # BLD AUTO: 368 X10E3/UL (ref 150–379)
RBC # BLD AUTO: 4.54 X10E6/UL (ref 3.77–5.28)
RH BLD: POSITIVE
RUBELLA, EXTERNAL: NORMAL
RUBV IGG SERPL IA-ACNC: 2.54 INDEX
T PALLIDUM AB SER QL IA: NEGATIVE
T. PALLIDUM, EXTERNAL: NEGATIVE
TYPE, ABO & RH, EXTERNAL: NORMAL
WBC # BLD AUTO: 12.1 X10E3/UL (ref 3.4–10.8)

## 2017-08-21 RX ORDER — TERCONAZOLE 4 MG/G
1 CREAM VAGINAL
Qty: 1 TUBE | Refills: 0 | Status: SHIPPED | OUTPATIENT
Start: 2017-08-21 | End: 2017-08-28

## 2017-08-21 RX ORDER — FLUCONAZOLE 150 MG/1
150 TABLET ORAL DAILY
Qty: 1 TAB | Refills: 0 | Status: SHIPPED | OUTPATIENT
Start: 2017-08-21 | End: 2017-08-21 | Stop reason: CLARIF

## 2017-08-21 NOTE — TELEPHONE ENCOUNTER
I usually recommend waiting until the second trimester. So she can take it in one month. Her body may naturally overcome the infection as well.

## 2017-08-21 NOTE — TELEPHONE ENCOUNTER
Patient's spouse Galo Sandhu (hippa verified) calling stating that the patient who is a , 7w5d pregnant and was seen at the urgent care on Friday (17) and diagnosed with BV and given a rx for flagyl. Upon trying to get the rx at the pharmacy, she was advised that taking flagyl in the first trimester is not safe. Please advise.

## 2017-08-22 NOTE — TELEPHONE ENCOUNTER
Elvia left her a message to notify her that she also has a yeast infection. This is likely causing her irriation not the bv. I sent a prescription for terazol to her pharmacy. This will help with the itching.

## 2017-08-25 ENCOUNTER — TELEPHONE (OUTPATIENT)
Dept: OBGYN CLINIC | Age: 32
End: 2017-08-25

## 2017-08-25 NOTE — TELEPHONE ENCOUNTER
LM-- appt for [facility] scheduled for 9/6/17 at the [address] [place] [address] location due to availability [phone]

## 2017-09-07 ENCOUNTER — HOSPITAL ENCOUNTER (OUTPATIENT)
Dept: PERINATAL CARE | Age: 32
Discharge: HOME | End: 2017-09-07
Attending: OBSTETRICS & GYNECOLOGY
Payer: COMMERCIAL

## 2017-09-07 PROCEDURE — 76801 OB US < 14 WKS SINGLE FETUS: CPT | Performed by: OBSTETRICS & GYNECOLOGY

## 2017-09-12 ENCOUNTER — ROUTINE PRENATAL (OUTPATIENT)
Dept: OBGYN CLINIC | Age: 32
End: 2017-09-12

## 2017-09-12 VITALS — WEIGHT: 166 LBS | DIASTOLIC BLOOD PRESSURE: 78 MMHG | SYSTOLIC BLOOD PRESSURE: 110 MMHG | BODY MASS INDEX: 27.62 KG/M2

## 2017-09-12 DIAGNOSIS — Z87.898 HISTORY OF PREDIABETES: Primary | ICD-10-CM

## 2017-09-12 DIAGNOSIS — Z34.01 ENCOUNTER FOR SUPERVISION OF NORMAL FIRST PREGNANCY IN FIRST TRIMESTER: ICD-10-CM

## 2017-09-12 DIAGNOSIS — Z34.01 PRENATAL CARE, FIRST PREGNANCY, FIRST TRIMESTER: ICD-10-CM

## 2017-09-12 NOTE — PROGRESS NOTES
Pt doing well, see prenatal flowsheet. Saw [address] [place] [address] last week.  They will perform her 20 week ultrasound  Early glucola and NIPS today

## 2017-09-12 NOTE — MR AVS SNAPSHOT
Visit Information Date & Time Provider Department Dept. Phone Encounter #  
 9/12/2017  2:50 PM Gina Carver MD Yog[facility] [phone] [id number] Your Appointments 12/15/2017  1:30 PM  
ROUTINE CARE with Marisa Kuhn MD  
[facility] [address] [place] [address]) Appt Note: f/u 6 month  
 [address] [place] [id number]  
[phone]  
  
   
 [address] [place] [address] Upcoming Health Maintenance Date Due INFLUENZA AGE 9 TO ADULT 8/1/2017 PAP AKA CERVICAL CYTOLOGY 8/15/2020 Allergies as of 9/12/2017  Review Complete On: 9/12/2017 By: Katelyn Merrill Severity Noted Reaction Type Reactions Ibuprofen  03/24/2014    Swelling Eyes Current Immunizations  Reviewed on 6/24/2013 Name Date  
 TB Skin Test (PPD) Intradermal 6/24/2013 Not reviewed this visit You Were Diagnosed With   
  
 Codes Comments History of prediabetes    -  Primary ICD-10-CM: T90.901 ICD-9-CM: V12.29 Prenatal care, first pregnancy, first trimester     ICD-10-CM: Z34.01 
ICD-9-CM: V22.0 Encounter for supervision of normal first pregnancy in first trimester     ICD-10-CM: Z34.01 
ICD-9-CM: V22.0 Vitals BP Weight(growth percentile) LMP BMI OB Status Smoking Status 110/78 166 lb (75.3 kg) 06/28/2017 (Exact Date) 27.62 kg/m2 Pregnant Never Smoker BMI and BSA Data Body Mass Index Body Surface Area  
 27.62 kg/m 2 1.86 m 2 Preferred Pharmacy Pharmacy Name Phone [facility] Cristofer Rausch, [address] Y[address] Foot [phone] Your Updated Medication List  
  
Notice  As of 9/12/2017  3:12 PM  
 You have not been prescribed any medications. We Performed the Following   
 GEST. DIABETES 1-HR SCREEN [64400 CPT(R)] Please provide this summary of care documentation to your next provider. Your primary care clinician is listed as Bhupendra Swift. If you have any questions after today's visit, please call [phone].

## 2017-09-13 LAB — GLUCOSE 1H P 50 G GLC PO SERPL-MCNC: 102 MG/DL (ref 65–129)

## 2017-09-14 LAB
T4 FREE SERPL-MCNC: 0.95 NG/DL (ref 0.82–1.77)
TSH SERPL DL<=0.005 MIU/L-ACNC: 0.57 UIU/ML (ref 0.45–4.5)

## 2017-09-17 NOTE — PROGRESS NOTES
Continue the same dose until you are past 3 months into pregnancy and later can go back to Methimazole     Need labs in her 4 th month of pregnancy TSH ,FT4 and FU a week after

## 2017-09-20 ENCOUNTER — TELEPHONE (OUTPATIENT)
Dept: ENDOCRINOLOGY | Age: 32
End: 2017-09-20

## 2017-09-20 DIAGNOSIS — E04.0 GOITER DIFFUSE: ICD-10-CM

## 2017-09-20 DIAGNOSIS — E05.00 GRAVES DISEASE: Primary | ICD-10-CM

## 2017-09-20 RX ORDER — METHIMAZOLE 5 MG/1
TABLET ORAL
Qty: 23 TAB | Refills: 11 | Status: SHIPPED | OUTPATIENT
Start: 2017-09-20 | End: 2017-12-15 | Stop reason: SDUPTHER

## 2017-09-20 NOTE — TELEPHONE ENCOUNTER
Informed pt of Dr. Amelia Wilcox note. Pt verbalized understanding with no further questions or concerns at this time. Lab slips mailed.

## 2017-09-20 NOTE — TELEPHONE ENCOUNTER
She can switch back to Methimazole past 12 weeks     Methimazole 5 mg alternating with 2.5 mg as she was doing before     Stop PTU     Labs as recommended

## 2017-09-20 NOTE — TELEPHONE ENCOUNTER
Attempted to call. Unsuccessful. Left msg for China Shields to give us a call back at the office. A callback number was left.

## 2017-09-20 NOTE — TELEPHONE ENCOUNTER
Attempted to call. Unsuccessful. Left msg for Jesse Alberts to give us a call back at the office. A callback number was left.

## 2017-09-20 NOTE — TELEPHONE ENCOUNTER
Per Dr. Mejia Hernandez, informed pt of result note, as noted above. Pt verbalized understanding. She stated she is in her 12th week of pregnancy now, so do you want her to switch back to methimazole next week?     Order placed for pt per verbal order with read back from Dr. Mejia Hernandez 09/20/17

## 2017-09-20 NOTE — TELEPHONE ENCOUNTER
----- Message from Mer Link MD sent at 9/17/2017  3:51 PM EDT -----  Continue the same dose until you are past 3 months into pregnancy and later can go back to Methimazole     Need labs in her 4 th month of pregnancy TSH ,FT4 and FU a week after

## 2017-10-09 ENCOUNTER — ROUTINE PRENATAL (OUTPATIENT)
Dept: OBGYN CLINIC | Age: 32
End: 2017-10-09

## 2017-10-09 VITALS — WEIGHT: 167.4 LBS | BODY MASS INDEX: 27.86 KG/M2 | DIASTOLIC BLOOD PRESSURE: 72 MMHG | SYSTOLIC BLOOD PRESSURE: 116 MMHG

## 2017-10-09 DIAGNOSIS — Z34.02 ENCOUNTER FOR SUPERVISION OF NORMAL FIRST PREGNANCY IN SECOND TRIMESTER: Primary | ICD-10-CM

## 2017-10-09 NOTE — MR AVS SNAPSHOT
Visit Information Date & Time Provider Department Dept. Phone Encounter #  
 10/9/2017  8:50 AM Nadia Diaz MD [facility] [phone] [id number] Your Appointments 12/15/2017  1:30 PM  
ROUTINE CARE with Emmett Byrd MD  
[facility] [address] [place] [address]) Appt Note: f/u 6 month  
 [address] [place] [address] [place] [id number]  
[phone]  
  
   
 [address] [place] [address] [address] [facility] [address] Upcoming Health Maintenance Date Due INFLUENZA AGE 9 TO ADULT 8/1/2017 PAP AKA CERVICAL CYTOLOGY 8/15/2020 Allergies as of 10/9/2017  Review Complete On: 10/9/2017 By: Ke Ventura RN Severity Noted Reaction Type Reactions Ibuprofen  03/24/2014    Swelling Eyes Current Immunizations  Reviewed on 6/24/2013 Name Date  
 TB Skin Test (PPD) Intradermal 6/24/2013 Not reviewed this visit Vitals BP Weight(growth percentile) LMP BMI OB Status Smoking Status 116/72 167 lb 6.4 oz (75.9 kg) 06/28/2017 (Exact Date) 27.86 kg/m2 Pregnant Never Smoker BMI and BSA Data Body Mass Index Body Surface Area  
 27.86 kg/m 2 1.87 m 2 Preferred Pharmacy Pharmacy Name Phone [facility] Em Roldan, [address] Yogesh [place]elles [phone] Your Updated Medication List  
  
   
This list is accurate as of: 10/9/17  9:03 AM.  Always use your most recent med list.  
  
  
  
  
 methIMAzole 5 mg tablet Commonly known as:  TAPAZOLE Alternate 5 mg with 2.5 mg daily. Patient Instructions Weeks 10 to 14 of Your Pregnancy: Care Instructions Your Care Instructions By weeks 10 to 15 of your pregnancy, the placenta has formed inside your uterus. It is possible to hear your baby's heartbeat with a special ultrasound device. Your baby's eyes can and do move. The arms and legs can bend. This is a good time to think about testing for birth defects. There are two types of tests: screening and diagnostic. Screening tests show the chance that a baby has a certain birth defect. They can't tell you for sure that your baby has a problem. Diagnostic tests show if a baby has a certain birth defect. It's your choice whether to have these tests. You and your partner can talk to your doctor or midwife about birth defects tests. Follow-up care is a key part of your treatment and safety. Be sure to make and go to all appointments, and call your doctor if you are having problems. It's also a good idea to know your test results and keep a list of the medicines you take. How can you care for yourself at home? Decide about tests · You can have screening tests and diagnostic tests to check for birth defects. The decision to have a test for birth defects is personal. Think about your age, your chance of passing on a family disease, your need to know about any problems, and what you might do after you have the test results. ¨ Triple or quadruple (quad) blood tests. These screening tests can be done between 15 and 20 weeks of pregnancy. They check the amounts of three or four substances in your blood. The doctor looks at these test results, along with your age and other factors, to find out the chance that your baby may have certain problems. ¨ Amniocentesis. This diagnostic test is used to look for chromosomal problems in the baby's cells. It can be done between 15 and 20 weeks of pregnancy, usually around week 16. 
¨ Nuchal translucency test. This test uses ultrasound to measure the thickness of the area at the back of the baby's neck. An increase in the thickness can be an early sign of Down syndrome. ¨ Chorionic villus sampling (CVS). This is a test that looks for certain genetic problems with your baby.  The same genes that are in your baby are in the placenta. A small piece of the placenta is taken out and tested. This test is done when you are 10 to 13 weeks pregnant. Ease discomfort · Slow down and take naps when you feel tired. · If your emotions swing, talk to someone. Crying, anxiety, and concentration problems are common. · If your gums bleed, try a softer toothbrush. If your gums are puffy and bleed a lot, see your dentist. 
· If you feel dizzy: ¨ Get up slowly after sitting or lying down. ¨ Drink plenty of fluids. ¨ Eat small snacks to keep your blood sugar stable. ¨ Put your head between your legs as though you were tying your shoelaces. ¨ Lie down with your legs higher than your head. Use pillows to prop up your feet. · If you have a headache: 
¨ Lie down. ¨ Ask your partner or a good friend for a neck massage. ¨ Try cool cloths over your forehead or across the back of your neck. ¨ Use acetaminophen (Tylenol) for pain relief. Do not use nonsteroidal anti-inflammatory drugs (NSAIDs), such as ibuprofen (Advil, Motrin) or naproxen (Aleve), unless your doctor says it is okay. · If you have a nosebleed, pinch your nose gently, and hold it for a short while. To prevent nosebleeds, try massaging a small dab of petroleum jelly, such as Vaseline, in your nostrils. · If your nose is stuffed up, try saline (saltwater) nose sprays. Do not use decongestant sprays. Care for your breasts · Wear a bra that gives you good support. · Know that changes in your breasts are normal. 
¨ Your breasts may get larger and more tender. Tenderness usually gets better by 12 weeks. ¨ Your nipples may get darker and larger, and small bumps around your nipples may show more. ¨ The veins in your chest and breasts may show more. · Don't worry about \"toughening'\" your nipples. Breastfeeding will naturally do this. Where can you learn more? Go to http://nallely-art.info/. Enter F602 in the search box to learn more about \"Weeks 10 to 14 of Your Pregnancy: Care Instructions. \" Current as of: March 16, 2017 Content Version: 11.3 © 9777-9004 [employer], [employer]. Care instructions adapted under license by [employer] (which disclaims liability or warranty for this information). If you have questions about a medical condition or this instruction, always ask your healthcare professional. [place] [address] any warranty or liability for your use of this information. Please provide this summary of care documentation to your next provider. Your primary care clinician is listed as Ilana Olivas. If you have any questions after today's visit, please call [phone].

## 2017-10-09 NOTE — PATIENT INSTRUCTIONS

## 2017-10-09 NOTE — PROGRESS NOTES
Pt doing well, see prenatal flowsheet. [facility] appointment scheduled for 20 week scan. Pt lives in [facility] and would like to see an MD at [facility].   Will f/u with Dr. Rick Floyd in 1 month

## 2017-10-26 LAB
T4 FREE SERPL-MCNC: 0.9 NG/DL (ref 0.82–1.77)
TSH SERPL DL<=0.005 MIU/L-ACNC: 0.54 UIU/ML (ref 0.45–4.5)

## 2017-10-30 ENCOUNTER — TELEPHONE (OUTPATIENT)
Dept: ENDOCRINOLOGY | Age: 32
End: 2017-10-30

## 2017-10-30 DIAGNOSIS — E05.90 HYPERTHYROIDISM: Primary | ICD-10-CM

## 2017-10-30 NOTE — TELEPHONE ENCOUNTER
----- Message from Kathy Leyva MD sent at 10/29/2017  3:21 PM EDT -----  Test is normal   TSH ,FT4 BNV

## 2017-10-30 NOTE — TELEPHONE ENCOUNTER
Per Dr. Lily Trimble, informed pt of result note, as noted above. Pt verbalized understanding and asked to have lab slips mailed to her. No further questions or concerns at this time.

## 2017-10-30 NOTE — TELEPHONE ENCOUNTER
Attempted to call. Unsuccessful. Left msg for Marietta Kocher to give us a call back at the office. A callback number was left.     Order placed for pt per verbal order with read back from Dr. Ventura Darling 10/30/17

## 2017-11-06 ENCOUNTER — ROUTINE PRENATAL (OUTPATIENT)
Dept: OBGYN CLINIC | Age: 32
End: 2017-11-06

## 2017-11-06 VITALS — DIASTOLIC BLOOD PRESSURE: 68 MMHG | SYSTOLIC BLOOD PRESSURE: 114 MMHG | BODY MASS INDEX: 28.36 KG/M2 | WEIGHT: 170.4 LBS

## 2017-11-06 DIAGNOSIS — Z34.02 ENCOUNTER FOR SUPERVISION OF NORMAL FIRST PREGNANCY IN SECOND TRIMESTER: ICD-10-CM

## 2017-11-06 NOTE — PROGRESS NOTES
Pt doing well. Has MFM appt for anatomy   Endo following. Vulvar rash resolving with expectant mgmt.

## 2017-11-06 NOTE — PATIENT INSTRUCTIONS

## 2017-11-20 ENCOUNTER — HOSPITAL ENCOUNTER (OUTPATIENT)
Dept: PERINATAL CARE | Age: 32
Discharge: HOME | End: 2017-11-20
Attending: OBSTETRICS & GYNECOLOGY
Payer: COMMERCIAL

## 2017-11-20 PROCEDURE — 76811 OB US DETAILED SNGL FETUS: CPT | Performed by: OBSTETRICS & GYNECOLOGY

## 2017-12-04 ENCOUNTER — ROUTINE PRENATAL (OUTPATIENT)
Dept: OBGYN CLINIC | Age: 32
End: 2017-12-04

## 2017-12-04 VITALS — BODY MASS INDEX: 29.55 KG/M2 | SYSTOLIC BLOOD PRESSURE: 116 MMHG | DIASTOLIC BLOOD PRESSURE: 74 MMHG | WEIGHT: 177.6 LBS

## 2017-12-04 DIAGNOSIS — Z3A.22 22 WEEKS GESTATION OF PREGNANCY: Primary | ICD-10-CM

## 2017-12-04 NOTE — MR AVS SNAPSHOT
Visit Information Date & Time Provider Department Dept. Phone Encounter #  
 12/4/2017  8:00 AM Jareth Connell MD [address] [facility] [phone] [id number] Your Appointments 12/15/2017  1:30 PM  
ROUTINE CARE with Sherren Fischer, MD  
[facility] [place]az[place]) Appt Note: f/u 6 month  
 [address] [facility] [address]  
[phone]  
  
   
 [address] [place] [address] Upcoming Health Maintenance Date Due Influenza Age 5 to Adult 8/1/2017 PAP AKA CERVICAL CYTOLOGY 8/15/2020 Allergies as of 12/4/2017  Review Complete On: 12/4/2017 By: Mel Evans Severity Noted Reaction Type Reactions Ibuprofen  03/24/2014    Swelling Eyes Current Immunizations  Reviewed on 6/24/2013 Name Date  
 TB Skin Test (PPD) Intradermal 6/24/2013 Not reviewed this visit Vitals BP Weight(growth percentile) LMP BMI OB Status Smoking Status 116/74 177 lb 9.6 oz (80.6 kg) 06/28/2017 (Exact Date) 29.55 kg/m2 Pregnant Never Smoker BMI and BSA Data Body Mass Index Body Surface Area  
 29.55 kg/m 2 1.92 m 2 Preferred Pharmacy Pharmacy Name Phone [facility] Jacki Mandujano, [address] Yogesh Stephens [place] [phone] Your Updated Medication List  
  
   
This list is accurate as of: 12/4/17  8:25 AM.  Always use your most recent med list.  
  
  
  
  
 methIMAzole 5 mg tablet Commonly known as:  TAPAZOLE Alternate 5 mg with 2.5 mg daily. Patient Instructions Weeks 22 to 26 of Your Pregnancy: Care Instructions Your Care Instructions As you enter your 7th month of pregnancy at week 26, your baby's lungs are growing stronger and getting ready to breathe. You may notice that your baby responds to the sound of your or your partner's voice.  You may also notice that your baby does less turning and twisting and more squirming or jerking. Jerking often means that your baby has the hiccups. Hiccups are perfectly normal and are only temporary. You may want to think about attending a childbirth preparation class. This is also a good time to start thinking about whether you want to have pain medicine during labor. Most pregnant women are tested for gestational diabetes between weeks 25 and 28. Gestational diabetes occurs when your blood sugar level gets too high when you're pregnant. The test is important, because you can have gestational diabetes and not know it. But the condition can cause problems for your baby. Follow-up care is a key part of your treatment and safety. Be sure to make and go to all appointments, and call your doctor if you are having problems. It's also a good idea to know your test results and keep a list of the medicines you take. How can you care for yourself at home? Ease discomfort from your baby's kicking · Change your position. Sometimes this will cause your baby to change position too. · Take a deep breath while you raise your arm over your head. Then breathe out while you drop your arm. Do Kegel exercises to prevent urine from leaking · You can do Kegel exercises while you stand or sit. ¨ Squeeze the same muscles you would use to stop your urine. Your belly and thighs should not move. ¨ Hold the squeeze for 3 seconds, and then relax for 3 seconds. ¨ Start with 3 seconds. Then add 1 second each week until you are able to squeeze for 10 seconds. ¨ Repeat the exercise 10 to 15 times for each session. Do three or more sessions each day. Ease or reduce swelling in your feet, ankles, hands, and fingers · If your fingers are puffy, take off your rings. · Do not eat high-salt foods, such as potato chips. · Prop up your feet on a stool or couch as much as possible. Sleep with pillows under your feet. · Do not stand for long periods of time or wear tight shoes. · Wear support stockings. Where can you learn more? Go to http://nallely-art.info/. Enter G264 in the search box to learn more about \"Weeks 22 to 26 of Your Pregnancy: Care Instructions. \" Current as of: March 16, 2017 Content Version: 11.4 © 1419-6667 [employer]BX. Care instructions adapted under license by [employer] (which disclaims liability or warranty for this information). If you have questions about a medical condition or this instruction, always ask your healthcare professional. [address] any warranty or liability for your use of this information. Please provide this summary of care documentation to your next provider. Your primary care clinician is listed as Robyn Fox. If you have any questions after today's visit, please call [phone].

## 2017-12-04 NOTE — PATIENT INSTRUCTIONS
Weeks 22 to 26 of Your Pregnancy: Care Instructions  Your Care Instructions    As you enter your 7th month of pregnancy at week 26, your baby's lungs are growing stronger and getting ready to breathe. You may notice that your baby responds to the sound of your or your partner's voice. You may also notice that your baby does less turning and twisting and more squirming or jerking. Jerking often means that your baby has the hiccups. Hiccups are perfectly normal and are only temporary. You may want to think about attending a childbirth preparation class. This is also a good time to start thinking about whether you want to have pain medicine during labor. Most pregnant women are tested for gestational diabetes between weeks 25 and 28. Gestational diabetes occurs when your blood sugar level gets too high when you're pregnant. The test is important, because you can have gestational diabetes and not know it. But the condition can cause problems for your baby. Follow-up care is a key part of your treatment and safety. Be sure to make and go to all appointments, and call your doctor if you are having problems. It's also a good idea to know your test results and keep a list of the medicines you take. How can you care for yourself at home? Ease discomfort from your baby's kicking  · Change your position. Sometimes this will cause your baby to change position too. · Take a deep breath while you raise your arm over your head. Then breathe out while you drop your arm. Do Kegel exercises to prevent urine from leaking  · You can do Kegel exercises while you stand or sit. ¨ Squeeze the same muscles you would use to stop your urine. Your belly and thighs should not move. ¨ Hold the squeeze for 3 seconds, and then relax for 3 seconds. ¨ Start with 3 seconds. Then add 1 second each week until you are able to squeeze for 10 seconds. ¨ Repeat the exercise 10 to 15 times for each session.  Do three or more sessions each day.  Ease or reduce swelling in your feet, ankles, hands, and fingers  · If your fingers are puffy, take off your rings. · Do not eat high-salt foods, such as potato chips. · Prop up your feet on a stool or couch as much as possible. Sleep with pillows under your feet. · Do not stand for long periods of time or wear tight shoes. · Wear support stockings. Where can you learn more? Go to http://nallely-art.info/. Enter G264 in the search box to learn more about \"Weeks 22 to 26 of Your Pregnancy: Care Instructions. \"  Current as of: March 16, 2017  Content Version: 11.4  © 8419-0562 [employer]wise, [employer]imb[employer]zz. Care instructions adapted under license by [employer] (which disclaims liability or warranty for this information). If you have questions about a medical condition or this instruction, always ask your healthcare professional. [place] [address] any warranty or liability for your use of this information.

## 2017-12-13 LAB
T4 FREE SERPL-MCNC: 0.78 NG/DL (ref 0.82–1.77)
TSH SERPL DL<=0.005 MIU/L-ACNC: 0.47 UIU/ML (ref 0.45–4.5)

## 2017-12-15 ENCOUNTER — OFFICE VISIT (OUTPATIENT)
Dept: ENDOCRINOLOGY | Age: 32
End: 2017-12-15

## 2017-12-15 VITALS
RESPIRATION RATE: 14 BRPM | HEIGHT: 65 IN | TEMPERATURE: 97.6 F | DIASTOLIC BLOOD PRESSURE: 61 MMHG | SYSTOLIC BLOOD PRESSURE: 111 MMHG | OXYGEN SATURATION: 100 % | HEART RATE: 76 BPM | BODY MASS INDEX: 29.57 KG/M2 | WEIGHT: 177.5 LBS

## 2017-12-15 DIAGNOSIS — E05.00 GRAVES' DISEASE: ICD-10-CM

## 2017-12-15 DIAGNOSIS — E05.00 GRAVES DISEASE: ICD-10-CM

## 2017-12-15 DIAGNOSIS — E04.0 GOITER DIFFUSE: Primary | ICD-10-CM

## 2017-12-15 DIAGNOSIS — E04.0 GOITER DIFFUSE: ICD-10-CM

## 2017-12-15 RX ORDER — CALCIUM CARBONATE 500(1250)
TABLET ORAL DAILY
Status: ON HOLD | COMMUNITY
End: 2018-04-03

## 2017-12-15 RX ORDER — ZINC GLUCONATE 10 MG
LOZENGE ORAL
COMMUNITY

## 2017-12-15 RX ORDER — METHIMAZOLE 5 MG/1
TABLET ORAL
Qty: 30 TAB | Refills: 11 | Status: SHIPPED | OUTPATIENT
Start: 2017-12-15

## 2017-12-15 NOTE — PROGRESS NOTES
Juan Holley is a 29 y.o. female here for   Chief Complaint   Patient presents with    Thyroid Problem       1. Have you been to the ER, urgent care clinic since your last visit? Hospitalized since your last visit? -n  o    2. Have you seen or consulted any other health care providers outside of the [address] [place] [address] since your last visit?   Include any pap smears or colon screening.-no    Wt Readings from Last 3 Encounters:   12/04/17 177 lb 9.6 oz (80.6 kg)   11/06/17 170 lb 6.4 oz (77.3 kg)   10/09/17 167 lb 6.4 oz (75.9 kg)     Temp Readings from Last 3 Encounters:   08/18/17 97.9 °F (36.6 °C)   08/03/17 98.1 °F (36.7 °C) (Oral)   06/14/17 98.6 °F (37 °C) (Oral)     BP Readings from Last 3 Encounters:   12/04/17 116/74   11/06/17 114/68   10/09/17 116/72     Pulse Readings from Last 3 Encounters:   08/18/17 75   08/03/17 82   06/14/17 64

## 2017-12-15 NOTE — PROGRESS NOTES
Chief Complaint   Patient presents with    Thyroid Problem       History of present illness    Chelsea Edge is a 29 y.o. female  here for fu of hyperthyroidism. Graves disease - on Methimazole  Compliant with MMI   24 weeks pregnant  DEISI 4/4/2018   On MMI   She is on methimazole 5 mg alternating with 2.5 mg daily. No fever or sore throat. She is followed by perinatologist    Has insurance now   Works at [employer] - no decrease in size    No dysphagia,dysphonia or dyspnea. Graves Dx in 2009 when she was in [facility], treated for a year and it recurred a year later, then she was Rx with PTU till 5/13, she was changed to [address] [place] [address] family   No FH of thyroid disease. No FH of thyroid cancer     Wt Readings from Last 3 Encounters:   12/15/17 177 lb 8 oz (80.5 kg)   12/04/17 177 lb 9.6 oz (80.6 kg)   11/06/17 170 lb 6.4 oz (77.3 kg)       Past Medical History:   Diagnosis Date    Endocrine disease     hypothyroid    Goiter     Graves disease     Hypothyroid     Prediabetes     Retinal detachment     Routine Papanicolaou smear 10/31/2016    Negative (No ECC) No HPV        Current Outpatient Prescriptions   Medication Sig    PNV38-Iron Cbn&Gluc-FA-DSS-DHA 35-1- mg cmpk Take  by mouth.  calcium carbonate (OS-MURALI) 500 mg calcium (1,250 mg) tablet Take  by mouth daily.  magnesium 250 mg tab Take  by mouth.  methIMAzole (TAPAZOLE) 5 mg tablet 2.5 mg daily every other day     No current facility-administered medications for this visit.           Review of Systems:  - Constitutional Symptoms: no fevers, chills, weight loss  - Eyes: no blurry vision or double vision  - Cardiovascular: no chest pain or palpitations  - Respiratory: no cough or shortness of breath  - Gastrointestinal: no dysphagia or abdominal pain  - Musculoskeletal: + joint pains or weakness  - Integumentary: no rashes  -     Physical Examination:  - Blood pressure 111/61, pulse 76, temperature 97.6 °F (36.4 °C), temperature source Oral, resp. rate 14, height 5' 5\" (1.651 m), weight 177 lb 8 oz (80.5 kg), last menstrual period 06/28/2017, SpO2 100 %. Body mass index is 29.54 kg/(m^2). - General: pleasant, no distress, good eye contact  - HEENT: no exopthalmos, no periorbital edema, no scleral/conjunctival injection, EOMI, no lid lag or stare  - Neck: supple, thyromegaly 2 times the size   - Cardiovascular: regular, normal rate, normal S1 and S2  - Respiratory: clear to auscultation bilaterally  - Gastrointestinal: soft, nontender, nondistended, BS +  - Musculoskeletal: no tremors, no edema  - Neurological: alert and oriented   - Psychiatric: normal mood and affect  - Skin - normal turgor    Data Reviewed:      Ref. Range 11/8/2012 22:15 12/11/2012 10:31 5/16/2013 08:55   T4, Free Latest Range: 0.82-1.77 ng/dL 2.1 (H) 1.0 0.73 (L)   T3, total Latest Range:  ng/dL   119   TSH Latest Range: 0.450-4.500 uIU/mL <0.01 (L) <0.01 (L) 4.230       Lab Results   Component Value Date/Time    WBC 12.1 08/15/2017 02:28 PM    HGB 11.6 08/15/2017 02:28 PM    HCT 36.1 08/15/2017 02:28 PM    PLATELET 537 59/74/8300 02:28 PM    MCV 80 08/15/2017 02:28 PM     Lab Results   Component Value Date/Time    AST (SGOT) 10 04/21/2017 10:48 AM    Alk. phosphatase 39 04/21/2017 10:48 AM     Lab Results   Component Value Date/Time    TSH 0.471 12/12/2017 03:40 PM    T4, Free 0.78 12/12/2017 03:41 PM        [x] Reviewed labs    Assessment/Plan:     1. Grave's disease - stopped MMI 12/16, relapsed and hence restarted   2. Goiter - US 7/15 no nodules  3. Hx of retinal detachment -   4. [address]  5. Prediabetes for A1 C-lifestyle changes     Lab Results   Component Value Date/Time    Hemoglobin A1c 5.4 08/03/2017 10:47 AM    Hemoglobin A1c (POC) 5.2 07/28/2015 02:30 PM       Methimazole-based on the thyroid function test, decrease methimazole to 2.5 mg every other day. She was on PTU during early part of pregnancy and now on methimazole.   Even though the drug of choice during lactation Propylthiouracil, methimazole can be used, she is on a small dose    Given the size of the gland and TRab she needs medication longer. Not interested in ANDERSEN when discussed before pregnancy    MOnitor labs in 2 months     Thank you for allowing me to participate in the care of this patient.     Vicky Phillip MD

## 2017-12-15 NOTE — MR AVS SNAPSHOT
Visit Information Date & Time Provider Department Dept. Phone Encounter #  
 12/15/2017  1:30 PM Kathy Leyva MD [facility] Diabetes & Endocrinology [phone] [id number] Follow-up Instructions Return in about 2 months (around 2/15/2018). 1/2/2018  8:00 AM  
OB VISIT with Prudence Reyes MD  
2220 [facility] ([facility]t [facility] [place] [phone]  
[address], [address] [place] [place] [address] Upcoming Health Maintenance Date Due DTaP/Tdap/Td series (1 - Tdap) 1/18/2008 Influenza Age 5 to Adult 8/1/2017 PAP AKA CERVICAL CYTOLOGY 8/15/2020 Allergies as of 12/15/2017  Review Complete On: 12/15/2017 By: Kathy Leyva MD  
  
 Severity Noted Reaction Type Reactions Ibuprofen  03/24/2014    Swelling Eyes Current Immunizations  Reviewed on 6/24/2013 Name Date  
 TB Skin Test (PPD) Intradermal 6/24/2013 Not reviewed this visit You Were Diagnosed With   
  
 Codes Comments Goiter diffuse    -  Primary ICD-10-CM: E04.9 ICD-9-CM: 240.9 Graves' disease     ICD-10-CM: E05.00 ICD-9-CM: 242.00 Vitals BP Pulse Temp Resp Height(growth percentile) Weight(growth percentile) 111/61 (BP 1 Location: Left arm, BP Patient Position: Sitting) 76 97.6 °F (36.4 °C) (Oral) 14 5' 5\" (1.651 m) 177 lb 8 oz (80.5 kg) LMP SpO2 BMI OB Status Smoking Status 06/28/2017 (Exact Date) 100% 29.54 kg/m2 Pregnant Never Smoker Vitals History BMI and BSA Data Body Mass Index Body Surface Area  
 29.54 kg/m 2 1.92 m 2 Preferred Pharmacy Pharmacy Name Phone [facility] [address], [address] [place] Bradley Ganser [phone] Your Updated Medication List  
  
   
This list is accurate as of: 12/15/17  2:14 PM.  Always use your most recent med list.  
  
  
  
  
 calcium carbonate 500 mg calcium (1,250 mg) tablet Commonly known as:  OS-MURALI Take  by mouth daily. magnesium 250 mg Tab Take  by mouth. methIMAzole 5 mg tablet Commonly known as:  TAPAZOLE Alternate 5 mg with 2.5 mg daily. PNV38-Iron Cbn&Gluc-FA-DSS-DHA 35-1- mg Cmpk Take  by mouth. Follow-up Instructions Return in about 2 months (around 2/15/2018). To-Do List   
 02/01/2018 Lab:  T4, FREE   
  
 02/01/2018 Lab:  TSH 3RD GENERATION Please provide this summary of care documentation to your next provider. Your primary care clinician is listed as Mary Morris. If you have any questions after today's visit, please call [phone].

## 2017-12-27 ENCOUNTER — OFFICE VISIT (OUTPATIENT)
Dept: INTERNAL MEDICINE CLINIC | Age: 32
End: 2017-12-27

## 2017-12-27 ENCOUNTER — HOSPITAL ENCOUNTER (EMERGENCY)
Age: 32
Discharge: HOME | End: 2017-12-27
Attending: OBSTETRICS & GYNECOLOGY | Admitting: OBSTETRICS & GYNECOLOGY
Payer: COMMERCIAL

## 2017-12-27 VITALS
HEIGHT: 65 IN | SYSTOLIC BLOOD PRESSURE: 110 MMHG | TEMPERATURE: 98.1 F | RESPIRATION RATE: 16 BRPM | OXYGEN SATURATION: 96 % | HEART RATE: 112 BPM | DIASTOLIC BLOOD PRESSURE: 69 MMHG | BODY MASS INDEX: 29.27 KG/M2 | WEIGHT: 175.7 LBS

## 2017-12-27 VITALS
RESPIRATION RATE: 16 BRPM | HEIGHT: 65 IN | SYSTOLIC BLOOD PRESSURE: 142 MMHG | WEIGHT: 178 LBS | DIASTOLIC BLOOD PRESSURE: 66 MMHG | BODY MASS INDEX: 29.66 KG/M2 | HEART RATE: 79 BPM | TEMPERATURE: 98 F

## 2017-12-27 DIAGNOSIS — E05.00 GRAVES' DISEASE: ICD-10-CM

## 2017-12-27 DIAGNOSIS — J06.9 VIRAL UPPER RESPIRATORY TRACT INFECTION: Primary | ICD-10-CM

## 2017-12-27 PROCEDURE — 99282 EMERGENCY DEPT VISIT SF MDM: CPT

## 2017-12-27 RX ORDER — CALCIUM CARBONATE 200(500)MG
1 TABLET,CHEWABLE ORAL DAILY
COMMUNITY

## 2017-12-27 NOTE — PROGRESS NOTES
[id number] Jean Alvin y/o  34w0d SIUP of Dr. Kristi Kwon presented to L&D ~ 35 min ago with CC \"of not feeling the baby move x past 24 hours\"  Denies UC's, VB, or other issues  Since arrival is feeling movement  PMH: Graves Disease on PTU  Followed by Royce and had FAS with MFM  O: A, A&O x 3  VSS  Visit Vitals    /66 (BP 1 Location: Left arm, BP Patient Position: Sitting)    Pulse 79    Temp 98 °F (36.7 °C)    Resp 16    Ht 5' 5\" (1.651 m)    Wt 178 lb (80.7 kg)    LMP 2017 (Exact Date)    Breastfeeding No    BMI 29.62 kg/m2   , Cat I tracing with 15x15 accels, no decels appropriate for gestational age  [de-identified]: none  A/P: 26 wk IUP with decreased fetal movement  Now reassured  Discharge home  RTO x 1 wk for routine appt with Dr. Randy Willoughby.  GABBY Babcock/BRITTNEY

## 2017-12-27 NOTE — DISCHARGE INSTRUCTIONS
DECREASED FETAL MOVEMENT DISCHARGE INSTRUCTIONS    Name: Ortiz Frye  YOB: 1985  Primary Diagnosis: Active Problems:    * No active hospital problems. *      Introduction: You came to the hospital because your baby is not moving as much as usual. This information may help you decide when you need to call your care provider and return to the hospital. There are several possible reasons your baby's movements have decreased. Sometimes, the baby is simply asleep. Many times the baby simply needs extra fluids. You can provide this by lying down on your side (to increase blood flow to the womb) and drinking a large glass of fluid. If this does not make the baby move four times in one hour, you need to contact your care provider as soon as possible. General:         Diet/Diet Restrictions:      Anything you like/tolerate    Physical Activity / Restrictions / Safety:     As tolerated. Other:        Discharge Instructions/ Special Treatment/ Home Care Needs:     Call your provider if:   Your baby is not moving as much as usual-at least 4 fetal movements in 1 hour after drinking and resting on your side for 1 hour.  You have regular, painful contractions every 5 minutes or less for one hour. Time your contractions from the beginning of one to the beginning of the next.  You have a gush of fluid or blood from your vagina (it is normal to have spotting after vaginal exam or intercourse).    Other:    labor instructions:    Uterine cramping (menstrual-like cramps, intermittent or constant   Uterine contractions every 10-15 minutes or more frequently   Low abdominal pressure (pelvic pressure)   Dull low backache (intermittent or constant)   Increase or change in vaginal discharge   Feeling that the baby is \"pushing down\"   Abdominal cramping with or without diarrhea  If any of these symptoms are experienced, stop what you are doing, lie down on your side, drink two to three glasses of water and wait one hour. If the symptoms persist or get worse, call your provider. Pain Management:             Signed By: Wil Palma RN                                                                                                   Date: 2017 Time: 2:16 AM    Discharge Checklist-NURSING TO COMPLETE:     Date and Time of Discharge: Date: 2017 Time: 2:16 AM    Return of:   Dental Appliance: Dental Appliances: None  Vision: Visual Aid: Glasses, With patient  Hearing Aid:    Jewelry: Jewelry: Ring, With patient  Clothing: Clothing: With patient, Undergarments, Footwear, Jacket/Coat, Shirt  Other Valuables: Other Valuables: With patient, Romero Medrano, [address] sent to safe: Personal Items Sent to Safe: none    Prescription Given: no  Medication Instruction Sheet(s), including side effects, provided: no    Accompanied By: Family    Mode of Transportation:    Discharge Disposition: Home    I have had the opportunity to make my options or choices for discharge. I have received and understand these instructions. [employer] Activation    Thank you for requesting access to [employer]. Please follow the instructions below to securely access and download your online medical record. [employer] allows you to send messages to your doctor, view your test results, renew your prescriptions, schedule appointments, and more. How Do I Sign Up? 1. In your internet browser, go to [email or web address]  2. Click on the First Time User? Click Here link in the Sign In box. You will be redirect to the New Member Sign Up page. 3. Enter your [employer] Access Code exactly as it appears below. You will not need to use this code after youve completed the sign-up process. If you do not sign up before the expiration date, you must request a new code. [employer] Access Code: Activation code not generated  Current [employer] Status: Patient Declined (This is the date your [employer] access code will )    4.  Enter the last four digits of your Social Security Number (xxxx) and Date of Birth (mm/dd/yyyy) as indicated and click Submit. You will be taken to the next sign-up page. 5. Create a [email or web address] ID. This will be your [employer] login ID and cannot be changed, so think of one that is secure and easy to remember. 6. Create a [email or web address] password. You can change your password at any time. 7. Enter your Password Reset Question and Answer. This can be used at a later time if you forget your password. 8. Enter your e-mail address. You will receive e-mail notification when new information is available in [address] 9. Click Sign Up. You can now view and download portions of your medical record. 10. Click the Download Summary menu link to download a portable copy of your medical information. Additional Information    If you have questions, please visit the Frequently Asked Questions section of the [email or web address] website at [email or web address] Remember, [email or web address] is NOT to be used for urgent needs. For medical emergencies, dial 911.

## 2017-12-27 NOTE — PROGRESS NOTES
Chief Complaint   Patient presents with    Cold Symptoms     patient complains of fever, cough, sore throat, and headache x 1 day. patient states that thyroid doctor lowered her thyroid medication x 2 weeks ago. she also states that she has noticed a \"spot\" on her neck that she is concerned about. she is a 29y.o. year old female who presents for congestion, nasal blockage, post nasal drip and productive cough for 2 days. low grade fevers. no nausea and no vomiting . No productive cough and white spots in throat. Over-the-counter remedies including none   . Hx Asthma:  no  Smoker:  no  Contacts with similar infections: yes   Recent travel:no       Reviewed and agree with Nurse Note and duplicated in this note. Reviewed PmHx, RxHx, FmHx, SocHx, AllgHx and updated and dated in the chart.     Family History   Problem Relation Age of Onset    Diabetes Father        Past Medical History:   Diagnosis Date    Abnormal Papanicolaou smear of cervix 2007    pt states follow-up normal    Anemia     Endocrine disease     hypothyroid    Goiter     Graves disease     Hypothyroid     Prediabetes     Retinal detachment     Routine Papanicolaou smear 10/31/2016    Negative (No ECC) No HPV       Social History     Social History    Marital status:      Spouse name: N/A    Number of children: N/A    Years of education: N/A     Social History Main Topics    Smoking status: Never Smoker    Smokeless tobacco: Never Used      Comment: Never used vapor or e-cigs     Alcohol use No    Drug use: No    Sexual activity: Yes     Partners: Male     Birth control/ protection: None     Other Topics Concern    None     Social History Narrative        Review of Systems - negative except as listed above      Objective:     Vitals:    12/27/17 1702   BP: 110/69   Pulse: (!) 112   Resp: 16   Temp: 98.1 °F (36.7 °C)   TempSrc: Oral   SpO2: 96%   Weight: 175 lb 11.2 oz (79.7 kg)   Height: 5' 5\" (1.651 m) Physical Examination: General appearance - alert, well appearing, and in no distress  Eyes - pupils equal and reactive, extraocular eye movements intact  Ears - bilateral TM's and external ear canals normal  Nose - mucosal congestion, mucosal erythema and clear rhinorrhea  Mouth - mucous membranes moist, pharynx normal without lesions  Neck - supple, no significant adenopathy  Chest - clear to auscultation, no wheezes, rales or rhonchi, symmetric air entry  Heart - normal rate, regular rhythm, normal S1, S2, no murmurs, rubs, clicks or gallops  Abdomen - soft, nontender, nondistended, no masses or organomegaly  Extremities - peripheral pulses normal, no pedal edema, no clubbing or cyanosis  Skin - normal coloration and turgor, no rashes, no suspicious skin lesions noted    Assessment/ Plan:   Diagnoses and all orders for this visit:    1. Viral upper respiratory tract infection    2. Graves' disease  -     TSH 3RD GENERATION  -     T4, FREE       Follow-up Disposition: Not on File      1) Remember to stay active and/or exercise regularly (I suggest 30-45 minutes daily)   2) For reliable dietary information, go to www. [employer].org. You may wish to consider seeing the nutritionist at [facility] [phone], also consider the [address] 3) I routinely suggest a complete physical exam once each year (your birth month)  I have discussed the diagnosis with the patient and the intended plan as seen in the above orders. The patient has received an after-visit summary and questions were answered concerning future plans. Medication Side Effects and Warnings were discussed with patient: yes  Patient Labs were reviewed and or requested: yes  Patient Past Records were reviewed and or requested  yes  I have discussed the diagnosis with the patient and the intended plan as seen in the above orders. Pt agrees to call or return to clinic and/or go to closest ER with any worsening of symptoms.   This may include, but not limited to increased fever (>100.4) with NSAIDS or Tylenol, increased edema, confusion, rash, worsening of presenting symptoms.

## 2017-12-27 NOTE — DISCHARGE SUMMARY
Antepartum  Discharge Summary     Patient ID:  Art Munoz  [id number]  11 y.o.  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 Patient Active Problem List   Diagnosis Code   Elnoria Rigoills' disease E05.00    Metrorrhagia N92.1    GERD (gastroesophageal reflux disease) K21.9    Tinnitus H93.19    Prediabetes R73.03    Goiter diffuse E04.9    Supervision of normal first pregnancy Z34.00       Procedures for this admission: NST    Hospital Course:   x 30 min for triage for decreased fetal movement    Disposition: Home or self care    Discharged Condition: stable    Patient plans to return for changes in her condition or the condition of the baby or for delivery of the baby. Patient Instructions:   Current Discharge Medication List      CONTINUE these medications which have NOT CHANGED    Details   calcium carbonate (TUMS) 200 mg calcium (500 mg) chew Take 1 Tab by mouth daily. PNV38-Iron Cbn&Gluc-FA-DSS-DHA 35-1- mg cmpk Take  by mouth. Associated Diagnoses: Goiter diffuse; Graves' disease      calcium carbonate (OS-MURALI) 500 mg calcium (1,250 mg) tablet Take  by mouth daily. Associated Diagnoses: Goiter diffuse; Graves' disease      magnesium 250 mg tab Take  by mouth.     Associated Diagnoses: Goiter diffuse; Graves' disease      methIMAzole (TAPAZOLE) 5 mg tablet 2.5 mg daily every other day  Qty: 30 Tab, Refills: 11    Associated Diagnoses: Graves disease; Goiter diffuse           Activity: Activity as tolerated  Diet: Regular Diet    Follow-up with   Follow-up Appointments   Procedures    FOLLOW UP VISIT Appointment in: One Week     Standing Status:   Standing     Number of Occurrences:   1     Order Specific Question:   Appointment in     Answer:    One Week        Signed:  Calderon Manrique NP  12/27/2017  2:26 AM

## 2017-12-27 NOTE — MR AVS SNAPSHOT
Visit Information Date & Time Provider Department Dept. Phone Encounter #  
 12/27/2017  4:30 PM 2400 [facility] MD [facility] Medicine and [place] [address] [id number] Your Appointments 2/22/2018 10:00 AM  
ROUTINE CARE with Behzad Tanner MD  
Care Diabetes & Endocrinology [facility]) [address] [place] [address]  
[phone]  
  
   
 [phone]  
  
    
  
 1/2/2018  8:00 AM  
OB VISIT with Alexis Schilling MD  
2220 [facility] ([facility]) Appt Note: [place] w/ [place], [address] [place] [address]  
100 [facility], [address] [place] [address] Upcoming Health Maintenance Date Due DTaP/Tdap/Td series (1 - Tdap) 1/18/2008 Influenza Age 5 to Adult 8/1/2017 PAP AKA CERVICAL CYTOLOGY 8/15/2020 Allergies as of 12/27/2017  Review Complete On: 12/27/2017 By: 2400 [facility] MD Mariam  
  
 Severity Noted Reaction Type Reactions Ibuprofen  03/24/2014    Swelling Eyes Current Immunizations  Reviewed on 6/24/2013 Name Date  
 TB Skin Test (PPD) Intradermal 6/24/2013 Not reviewed this visit You Were Diagnosed With   
  
 Codes Comments Viral upper respiratory tract infection    -  Primary ICD-10-CM: J06.9, B97.89 ICD-9-CM: 465.9 Graves' disease     ICD-10-CM: E05.00 ICD-9-CM: 242.00 Vitals BP Pulse Temp Resp Height(growth percentile) Weight(growth percentile) 110/69 (BP 1 Location: Right arm, BP Patient Position: Sitting) (!) 112 98.1 °F (36.7 °C) (Oral) 16 5' 5\" (1.651 m) 175 lb 11.2 oz (79.7 kg) LMP SpO2 BMI OB Status Smoking Status 06/28/2017 (Exact Date) 96% 29.24 kg/m2 Pregnant Never Smoker BMI and BSA Data Body Mass Index Body Surface Area  
 29.24 kg/m 2 1.91 m 2 Preferred Pharmacy Pharmacy Name Phone [facility] Ricco Lieberman, [address] [place] [address] [place] [phone] Your Updated Medication List  
  
   
This list is accurate as of: 12/27/17  5:24 PM.  Always use your most recent med list.  
  
  
  
  
 calcium carbonate 200 mg calcium (500 mg) Jenean Parrot Commonly known as:  TUMS Take 1 Tab by mouth daily. calcium carbonate 500 mg calcium (1,250 mg) tablet Commonly known as:  OS-MURALI Take  by mouth daily. magnesium 250 mg Tab Take  by mouth. methIMAzole 5 mg tablet Commonly known as:  TAPAZOLE  
2.5 mg daily every other day PNV38-Iron Cbn&Gluc-FA-DSS-DHA 35-1- mg Cmpk Take  by mouth. We Performed the Following T4, FREE [id number] CPT(R)] TSH 3RD GENERATION [79117 CPT(R)] Patient Instructions Normal saline spray Hot steam showers any modifiers Robitussin DM and Benadryl for cough cold Please provide this summary of care documentation to your next provider. Your primary care clinician is listed as Sylvain Murphy. If you have any questions after today's visit, please call [phone].

## 2017-12-27 NOTE — IP AVS SNAPSHOT
[address] 
[phone] Patient: Geronimo Figueroa MRN: [id number] SQZ:[id number] My Medications ASK your physician about these medications Instructions Each Dose to Equal  
 Morning Noon Evening Bedtime  
 calcium carbonate 200 mg calcium (500 mg) Chew Commonly known as:  TUMS Your last dose was: Your next dose is: Take 1 Tab by mouth daily. 1 Tab  
    
   
   
   
  
 calcium carbonate 500 mg calcium (1,250 mg) tablet Commonly known as:  OS-MURALI Your last dose was: Your next dose is: Take  by mouth daily. magnesium 250 mg Tab Your last dose was: Your next dose is: Take  by mouth. methIMAzole 5 mg tablet Commonly known as:  TAPAZOLE Your last dose was: Your next dose is:    
   
   
 2.5 mg daily every other day PNV38-Iron Cbn&Gluc-FA-DSS-DHA 35-1- mg Cmpk Your last dose was: Your next dose is: Take  by mouth.

## 2017-12-27 NOTE — IP AVS SNAPSHOT
[address] [facility] [address] [place] [address] 
[phone] Patient: Daniel Rodriguez MRN: [id number] OPV:[id number]/02/[id number] About your hospitalization You were admitted on:  N/A You last received care in the:  [facility] 3 LABOR & DELIVERY You were discharged on:  December 27, 2017 Why you were hospitalized Your primary diagnosis was:  Not on File Things You Need To Do (next 8 weeks) Tuesday Jan 02, 2018 OB VISIT with Lanier Najjar, MD at  8:00 AM  
Where:  [address] ([facility]) Discharge Orders None A check barbara indicates which time of day the medication should be taken. My Medications ASK your physician about these medications Instructions Each Dose to Equal  
 Morning Noon Evening Bedtime  
 calcium carbonate 200 mg calcium (500 mg) Chew Commonly known as:  TUMS Your last dose was: Your next dose is: Take 1 Tab by mouth daily. 1 Tab  
    
   
   
   
  
 calcium carbonate 500 mg calcium (1,250 mg) tablet Commonly known as:  OS-MURALI Your last dose was: Your next dose is: Take  by mouth daily. magnesium 250 mg Tab Your last dose was: Your next dose is: Take  by mouth. methIMAzole 5 mg tablet Commonly known as:  TAPAZOLE Your last dose was: Your next dose is:    
   
   
 2.5 mg daily every other day PNV38-Iron Cbn&Gluc-FA-DSS-DHA 35-1- mg Cmpk Your last dose was: Your next dose is: Take  by mouth. Discharge Instructions DECREASED FETAL MOVEMENT DISCHARGE INSTRUCTIONS Name: Daniel Rodriguez YOB: 1985 Primary Diagnosis: Active Problems: * No active hospital problems.  * 
 
 
 Introduction: You came to the hospital because your baby is not moving as much as usual. This information may help you decide when you need to call your care provider and return to the hospital. There are several possible reasons your baby's movements have decreased. Sometimes, the baby is simply asleep. Many times the baby simply needs extra fluids. You can provide this by lying down on your side (to increase blood flow to the womb) and drinking a large glass of fluid. If this does not make the baby move four times in one hour, you need to contact your care provider as soon as possible. General:  
 
 
 
Diet/Diet Restrictions:   
 
Anything you like/tolerate Physical Activity / Restrictions / Safety: As tolerated. Other:  
    
Discharge Instructions/ Special Treatment/ Home Care Needs:  
 
Call your provider if: 
? Your baby is not moving as much as usual-at least 4 fetal movements in 1 hour after drinking and resting on your side for 1 hour. ? You have regular, painful contractions every 5 minutes or less for one hour. Time your contractions from the beginning of one to the beginning of the next. ? You have a gush of fluid or blood from your vagina (it is normal to have spotting after vaginal exam or intercourse). ? Other:  
 labor instructions:  
? Uterine cramping (menstrual-like cramps, intermittent or constant ? Uterine contractions every 10-15 minutes or more frequently ? Low abdominal pressure (pelvic pressure) ? Dull low backache (intermittent or constant) ? Increase or change in vaginal discharge ? Feeling that the baby is \"pushing down\" ? Abdominal cramping with or without diarrhea If any of these symptoms are experienced, stop what you are doing, lie down on your side, drink two to three glasses of water and wait one hour. If the symptoms persist or get worse, call your provider. Pain Management: Signed By: Nadir Mitchell RN                                                                                                   Date: 2017 Time: 2:16 AM 
 
Discharge Checklist-NURSING TO COMPLETE:  
 
Date and Time of Discharge: Date: 2017 Time: 2:16 AM 
 
Return of:  
Dental Appliance: Dental Appliances: None Vision: Visual Aid: Glasses, With patient Hearing Aid:   
Jewelry: Jewelry: Ring, With patient Clothing: Clothing: With patient, Undergarments, Footwear, Jacket/Coat, Shirt Other Valuables: Other Valuables: With patient, Reema Lau Valuables sent to safe: Personal Items Sent to Safe: none Prescription Given: no 
Medication Instruction Sheet(s), including side effects, provided: no 
 
Accompanied By: Family Mode of Transportation: 
 
Discharge Disposition: Home I have had the opportunity to make my options or choices for discharge. I have received and understand these instructions. Nexant Activation Thank you for requesting access to [employer]ant. Please follow the instructions below to securely access and download your online medical record. Nexant allows you to send messages to your doctor, view your test results, renew your prescriptions, schedule appointments, and more. How Do I Sign Up? 1. In your internet browser, go to [email or web address] 
2. Click on the First Time User? Click Here link in the Sign In box. You will be redirect to the New Member Sign Up page. 3. Enter your Nexant Access Code exactly as it appears below. You will not need to use this code after youve completed the sign-up process. If you do not sign up before the expiration date, you must request a new code. Nexant Access Code: Activation code not generated Current Nexant Status: Patient Declined (This is the date your Nexant access code will ) 4.  Enter the last four digits of your Social Security Number (xxxx) and Date of Birth (mm/dd/yyyy) as indicated and click Submit. You will be taken to the next sign-up page. 5. Create a EyeCyte ID. This will be your EyeCyte login ID and cannot be changed, so think of one that is secure and easy to remember. 6. Create a EyeCyte password. You can change your password at any time. 7. Enter your Password Reset Question and Answer. This can be used at a later time if you forget your password. 8. Enter your e-mail address. You will receive e-mail notification when new information is available in [address] 9. Click Sign Up. You can now view and download portions of your medical record. 10. Click the Download Summary menu link to download a portable copy of your medical information. Additional Information If you have questions, please visit the Frequently Asked Questions section of the EyeCyte website at [email or web address] Remember, EyeCyte is NOT to be used for urgent needs. For medical emergencies, dial 911. Providers Seen During Your Hospitalization Provider Specialty Primary office phone Gamaliel Madrigal MD Obstetrics & Gynecology [phone] Your Primary Care Physician (PCP) Primary Care Physician Office Phone Office Fax Israel Sauceda [phone] You are allergic to the following Allergen Reactions Ibuprofen Swelling Eyes Recent Documentation Height Weight Breastfeeding? BMI OB Status Smoking Status 1.651 m 80.7 kg No 29.62 kg/m2 Pregnant Never Smoker Emergency Contacts Name Discharge Info Relation Home Work Mobile Sven Mathew  Spouse [3]   [phone] MartinezRowdy  Spouse [3] [phone] Patient Belongings  The following personal items are in your possession at time of discharge: 
  Dental Appliances: None  Visual Aid: Glasses, With patient      Home Medications: None   Jewelry: Ring, With patient  Clothing: With patient, Undergarments, Footwear, Jacket/Coat, Shirt    Other Valuables: With patient, Radha Kendall  Personal Items Sent to Safe: none Please provide this summary of care documentation to your next provider. Signatures-by signing, you are acknowledging that this After Visit Summary has been reviewed with you and you have received a copy. Patient Signature:  ____________________________________________________________ Date:  ____________________________________________________________  
  
Amina Moulds Provider Signature:  ____________________________________________________________ Date:  ____________________________________________________________

## 2017-12-27 NOTE — PROGRESS NOTES
@56 DTroy Alaniz in to see patient, review strip, discussed pt symptoms, recd orders to d/c pt to home;  @0213 pt off monitor, up to gown  @0225 d/c instructions reviewed with patient, verbal acknowledgment noted,   @0230 pt d/c to home, accompanied by spouse;

## 2017-12-27 NOTE — PROGRESS NOTES
0135 - Pt arrived c/o decreased fetal movement over the past 24 hours. Pt denies any leaking of fluid, vaginal bleeding, contractions, falls or trauma to abdomen, or complications with pregnancy. External monitors applied. 0208 - D. Akosua Shown notified of pt arrival and complaint of decreased fetal movement over the past 24 hours. Reactive tracing and pt now feeling movement. States she will be out to see pt.

## 2017-12-27 NOTE — LETTER
NOTIFICATION RETURN TO WORK / SCHOOL 
 
12/27/2017 5:25 PM 
 
Ms. Deidra Fuentesova [address] [facility] [id number] [place] [address] To Whom It May Concern: 
 
Deidra James is currently under the care of Ofelia Wells 105 12/27/2017-12/28/2017. She will return to work/school on: 12/19/2017. If there are questions or concerns please have the patient contact our office. Sincerely, Mary Morris MD

## 2017-12-27 NOTE — LETTER
1/3/2018 11:54 AM 
 
Ms. Maria Luz Lyons [id number] [place] [address] Dear Maria Luz Lyons: We were trying to reach you, but have been unsuccessful. Please find your most recent results below. Resulted Orders TSH 3RD GENERATION Result Value Ref Range TSH 0.221 (L) 0.450 - 4.500 uIU/mL Narrative Performed at:  [address]  [id number] : Gisell Pettit MD, Phone:  [phone] T4, FREE Result Value Ref Range T4, Free 0.85 0.82 - 1.77 ng/dL Narrative Performed at:  [address]  [id number] : Gisell Pettit MD, Phone:  [phone] RECOMMENDATIONS: 
Notify patient that the tests are normal for pregnancy, continue the same dose of methimazole as was discussed during the office visit Please call me if you have any questions: [phone] Sincerely, 
 
 
[facility] End[facility]

## 2017-12-28 ENCOUNTER — HOSPITAL ENCOUNTER (EMERGENCY)
Age: 32
Discharge: HOME | End: 2017-12-28
Attending: EMERGENCY MEDICINE
Payer: COMMERCIAL

## 2017-12-28 VITALS
TEMPERATURE: 98.3 F | HEIGHT: 65 IN | WEIGHT: 175.4 LBS | OXYGEN SATURATION: 98 % | SYSTOLIC BLOOD PRESSURE: 121 MMHG | HEART RATE: 95 BPM | RESPIRATION RATE: 16 BRPM | BODY MASS INDEX: 29.22 KG/M2 | DIASTOLIC BLOOD PRESSURE: 80 MMHG

## 2017-12-28 DIAGNOSIS — B34.9 VIRAL ILLNESS: Primary | ICD-10-CM

## 2017-12-28 DIAGNOSIS — Z3A.26 26 WEEKS GESTATION OF PREGNANCY: ICD-10-CM

## 2017-12-28 DIAGNOSIS — R09.81 CONGESTION OF NASAL SINUS: ICD-10-CM

## 2017-12-28 DIAGNOSIS — R50.9 FEVER, UNSPECIFIED FEVER CAUSE: ICD-10-CM

## 2017-12-28 LAB
S PYO AG THROAT QL: NEGATIVE
T4 FREE SERPL-MCNC: 0.85 NG/DL (ref 0.82–1.77)
TSH SERPL DL<=0.005 MIU/L-ACNC: 0.22 UIU/ML (ref 0.45–4.5)

## 2017-12-28 PROCEDURE — 99283 EMERGENCY DEPT VISIT LOW MDM: CPT

## 2017-12-28 PROCEDURE — 87880 STREP A ASSAY W/OPTIC: CPT

## 2017-12-28 PROCEDURE — 87070 CULTURE OTHR SPECIMN AEROBIC: CPT | Performed by: EMERGENCY MEDICINE

## 2017-12-28 NOTE — Clinical Note
Thank you for allowing us to care for you today. Please follow-up with your Primary Care provider in the next 2-3 days if your symptoms do not improve. Plan for home: Follow-up with your OB Dr. Demario Jeffery. They will let you know which over-the-counter  medications are OK during pregnancy. Tylenol is OK during pregnancy. Ibuprofen is not. Use a Netti Pot twice daily as directed for congestion. Wash your hands frequently to help prevent illness. Ask family & friends to do the same.

## 2017-12-28 NOTE — ED TRIAGE NOTES
Patient arrives for nasal congestion, cough, and sore throat. Reports  had the same thing and has been on antibiotics. Took 1 tylenol approx a hour ago.

## 2017-12-28 NOTE — DISCHARGE INSTRUCTIONS
Weeks 22 to 26 of Your Pregnancy: Care Instructions  Your Care Instructions    As you enter your 7th month of pregnancy at week 26, your baby's lungs are growing stronger and getting ready to breathe. You may notice that your baby responds to the sound of your or your partner's voice. You may also notice that your baby does less turning and twisting and more squirming or jerking. Jerking often means that your baby has the hiccups. Hiccups are perfectly normal and are only temporary. You may want to think about attending a childbirth preparation class. This is also a good time to start thinking about whether you want to have pain medicine during labor. Most pregnant women are tested for gestational diabetes between weeks 25 and 28. Gestational diabetes occurs when your blood sugar level gets too high when you're pregnant. The test is important, because you can have gestational diabetes and not know it. But the condition can cause problems for your baby. Follow-up care is a key part of your treatment and safety. Be sure to make and go to all appointments, and call your doctor if you are having problems. It's also a good idea to know your test results and keep a list of the medicines you take. How can you care for yourself at home? Ease discomfort from your baby's kicking  · Change your position. Sometimes this will cause your baby to change position too. · Take a deep breath while you raise your arm over your head. Then breathe out while you drop your arm. Do Kegel exercises to prevent urine from leaking  · You can do Kegel exercises while you stand or sit. ¨ Squeeze the same muscles you would use to stop your urine. Your belly and thighs should not move. ¨ Hold the squeeze for 3 seconds, and then relax for 3 seconds. ¨ Start with 3 seconds. Then add 1 second each week until you are able to squeeze for 10 seconds. ¨ Repeat the exercise 10 to 15 times for each session.  Do three or more sessions each day.  Ease or reduce swelling in your feet, ankles, hands, and fingers  · If your fingers are puffy, take off your rings. · Do not eat high-salt foods, such as potato chips. · Prop up your feet on a stool or couch as much as possible. Sleep with pillows under your feet. · Do not stand for long periods of time or wear tight shoes. · Wear support stockings. Where can you learn more? Go to http://nallely-art.info/. Enter G264 in the search box to learn more about \"Weeks 22 to 26 of Your Pregnancy: Care Instructions. \"  Current as of: March 16, 2017  Content Version: 11.4  © 3955-6529 Healthwise, 5minutes. Care instructions adapted under license by [employer] (which disclaims liability or warranty for this information). If you have questions about a medical condition or this instruction, always ask your healthcare professional. [place] [address] any warranty or liability for your use of this information.

## 2017-12-28 NOTE — ED PROVIDER NOTES
HPI Comments: The patient is a 29 y.o. female with past medical history significant for 26 week pregnancy, Graves disease who presents from home with chief complaint of nasal congestion, cough and low grade fevers. Patient states that over the last few days she has developed a cough, nasal congestion with intermittent epistaxis, and low grade fevers about 100 degrees. Her cough is productive of green mucous. Her throat is sore from coughing. She has tried tylenol for her fevers. Patient denies: nausea, vomiting, diarrhea, constipation, loss of bowel or bladder, chest pain, shortness of breath or dyspnea on exertion. Nothing makes the symptoms better or worse. There are no other acute medical concerns at this time. PCP: Malvin Barron MD  OB: Dr. Jorge Narayan    The history is provided by the patient. No  was used. Past Medical History:   Diagnosis Date    Abnormal Papanicolaou smear of cervix 2007    pt states follow-up normal    Anemia     Endocrine disease     hypothyroid    Goiter     Graves disease     Hypothyroid     Prediabetes     Retinal detachment     Routine Papanicolaou smear 10/31/2016    Negative (No ECC) No HPV        Past Surgical History:   Procedure Laterality Date    HX HEENT      jaw surgery    HX TUMOR REMOVAL      facial tumor removed         Family History:   Problem Relation Age of Onset    Diabetes Father        Social History     Social History    Marital status:      Spouse name: N/A    Number of children: N/A    Years of education: N/A     Occupational History    Not on file.      Social History Main Topics    Smoking status: Never Smoker    Smokeless tobacco: Never Used      Comment: Never used vapor or e-cigs     Alcohol use No    Drug use: No    Sexual activity: Yes     Partners: Male     Birth control/ protection: None     Other Topics Concern    Not on file     Social History Narrative         ALLERGIES: Ibuprofen    Review of Systems   Constitutional: Positive for chills and fever. Negative for appetite change. HENT: Positive for congestion, nosebleeds and sore throat. Negative for drooling, ear pain and facial swelling. Respiratory: Positive for cough. Negative for shortness of breath and wheezing. Cardiovascular: Negative for chest pain. Gastrointestinal: Negative for abdominal pain, constipation, diarrhea, nausea and vomiting. Genitourinary: Negative for dysuria and urgency. Musculoskeletal: Negative for back pain. Skin: Negative for color change and rash. Neurological: Negative for dizziness and headaches. Psychiatric/Behavioral: Negative. All other systems reviewed and are negative. Vitals:    12/28/17 1620   BP: 121/80   Pulse: 95   Resp: 16   Temp: 98.3 °F (36.8 °C)   SpO2: 98%   Weight: 79.6 kg (175 lb 6.4 oz)   Height: 5' 5\" (1.651 m)            Physical Exam   Constitutional: She is oriented to person, place, and time. She appears well-developed and well-nourished. HENT:   Head: Normocephalic and atraumatic. Nose: Right sinus exhibits no maxillary sinus tenderness and no frontal sinus tenderness. Left sinus exhibits no maxillary sinus tenderness and no frontal sinus tenderness. Mouth/Throat: Uvula is midline, oropharynx is clear and moist and mucous membranes are normal. She does not have dentures. No oral lesions. No trismus in the jaw. Normal dentition. No dental abscesses, uvula swelling, lacerations or dental caries. Bilateral cerumen impaction. Very dry cerumen    Bilateral nares, boggy turbinates   Neck: Normal range of motion. Neck supple. Cardiovascular: Normal rate, regular rhythm, normal heart sounds and intact distal pulses. Exam reveals no gallop and no friction rub. No murmur heard. Pulmonary/Chest: Effort normal and breath sounds normal. No respiratory distress. She has no wheezes. She has no rales. She exhibits no tenderness. Abdominal: Soft.  Bowel sounds are normal. There is no tenderness. There is no guarding. Musculoskeletal: Normal range of motion. Lymphadenopathy:        Head (right side): No submental, no submandibular, no tonsillar and no preauricular adenopathy present. Head (left side): No submental, no submandibular, no tonsillar and no preauricular adenopathy present. She has no cervical adenopathy. Neurological: She is alert and oriented to person, place, and time. Skin: Skin is warm and dry. No erythema. Psychiatric: She has a normal mood and affect. Her behavior is normal. Judgment and thought content normal.   Nursing note and vitals reviewed. MDM  Number of Diagnoses or Management Options  Congestion of nasal sinus:   Fever, unspecified fever cause:   Viral illness:     ED Course     Assessment & Plan:     Rapid strep    Discussed with MD Msohe Rivas NP  12/28/17  5:04 PM    If strep is negative will Dc home with instructions for [facility] for congestion. Moshe Juan NP  12/28/17  5:17 PM    5:56 PM  The patient has been reevaluated. The patient is ready for discharge. The patient's signs, symptoms, diagnosis, and discharge instructions have been discussed and the patient/ family has conveyed their understanding. The patient is to follow up as recommended or return to the ED should their symptoms worsen. Plan has been discussed and the patient is in agreement. LABORATORY TESTS:  Recent Results (from the past 12 hour(s))   POC GROUP A STREP    Collection Time: 12/28/17  5:45 PM   Result Value Ref Range    Group A strep (POC) NEGATIVE  NEG         IMAGING RESULTS:  No orders to display     No results found. MEDICATIONS GIVEN:  Medications - No data to display    IMPRESSION:  1. Viral illness    2. Fever, unspecified fever cause    3. Congestion of nasal sinus        PLAN:  1.    Current Discharge Medication List      START taking these medications    Details   sod bicarb-sod chlor-neti pot (SINUS 8 Marcioe Tico Labidi NETIPOT) pkdv 1 Package by Nasal route two (2) times daily as needed. For congestion  Indications: Nasal Congestion  Qty: 30 Each, Refills: 0           2. Follow-up Information     Follow up With Details Comments Duran Cano 36., MD Call and ask about which medications are safe with pregnency [address]      Jay Ardon MD  As needed [address] [place] [address] [phone]      [address] [facility] [address] DEP  As needed, If symptoms worsen [facility]  [phone]        3.  Call OB and discuss options    Return to ED for new or worsening symptoms       Marva Castro NP      Procedures

## 2017-12-29 ENCOUNTER — TELEPHONE (OUTPATIENT)
Dept: ENDOCRINOLOGY | Age: 32
End: 2017-12-29

## 2017-12-29 NOTE — PROGRESS NOTES
Notify patient that the tests are normal for pregnancy, continue the same dose of methimazole as was discussed during the office visit

## 2017-12-29 NOTE — TELEPHONE ENCOUNTER
Attempted to call. Unsuccessful. Left msg for Madeline Webster to give us a call back at the office. A callback number was left.

## 2017-12-29 NOTE — TELEPHONE ENCOUNTER
----- Message from Sandy Garcia MD sent at 12/29/2017 11:58 AM EST -----  Notify patient that the tests are normal for pregnancy, continue the same dose of methimazole as was discussed during the office visit

## 2017-12-31 LAB
BACTERIA SPEC CULT: NORMAL
SERVICE CMNT-IMP: NORMAL

## 2018-01-02 ENCOUNTER — ROUTINE PRENATAL (OUTPATIENT)
Dept: OBGYN CLINIC | Age: 33
End: 2018-01-02

## 2018-01-02 VITALS — BODY MASS INDEX: 29.42 KG/M2 | DIASTOLIC BLOOD PRESSURE: 68 MMHG | SYSTOLIC BLOOD PRESSURE: 116 MMHG | WEIGHT: 176.8 LBS

## 2018-01-02 DIAGNOSIS — Z34.92 NORMAL PREGNANCY, SECOND TRIMESTER: Primary | ICD-10-CM

## 2018-01-02 NOTE — MR AVS SNAPSHOT
Visit Information Date & Time Provider Department Dept. Phone Encounter #  
 1/2/2018  8:00 AM Jose Alcazar MD [address] [facility] [phone] [id number] Your Appointments 2/22/2018 10:00 AM  
ROUTINE CARE with Pat Avilez MD  
[facility] [address] [facility]) [address] [place] [address]  
[phone]  
  
   
 [address] [place] [address] Upcoming Health Maintenance Date Due Influenza Age 5 to Adult 8/1/2017 PAP AKA CERVICAL CYTOLOGY 8/15/2020 Allergies as of 1/2/2018  Review Complete On: 1/2/2018 By: Karson Dumont Severity Noted Reaction Type Reactions Ibuprofen  03/24/2014    Swelling Eyes Current Immunizations  Reviewed on 6/24/2013 Name Date  
 TB Skin Test (PPD) Intradermal 6/24/2013 Not reviewed this visit You Were Diagnosed With   
  
 Codes Comments Normal pregnancy, second trimester    -  Primary ICD-10-CM: Z34.92 
ICD-9-CM: V22.2 Vitals BP Weight(growth percentile) LMP BMI OB Status Smoking Status 116/68 176 lb 12.8 oz (80.2 kg) 06/28/2017 (Exact Date) 29.42 kg/m2 Pregnant Never Smoker BMI and BSA Data Body Mass Index Body Surface Area  
 29.42 kg/m 2 1.92 m 2 Preferred Pharmacy Pharmacy Name Phone [facility] [address] [place], [address] [place] [place] [phone] Your Updated Medication List  
  
   
This list is accurate as of: 1/2/18  8:21 AM.  Always use your most recent med list.  
  
  
  
  
 calcium carbonate 200 mg calcium (500 mg) Hayley Belt Commonly known as:  TUMS Take 1 Tab by mouth daily. calcium carbonate 500 mg calcium (1,250 mg) tablet Commonly known as:  OS-MURALI Take  by mouth daily. magnesium 250 mg Tab Take  by mouth. methIMAzole 5 mg tablet Commonly known as:  TAPAZOLE  
2.5 mg daily every other day PNV38-Iron Cbn&Gluc-FA-DSS-DHA 35-1- mg Cmpk Take  by mouth.  
  
 sod bicarb-sod chlor-neti pot Pkdv Commonly known as:  SINUS [address] NETIPOT  
1 Package by Nasal route two (2) times daily as needed. For congestion  Indications: Nasal Congestion We Performed the Following   
 GEST. DIABETES 1-HR SCREEN [48826 CPT(R)] Patient Instructions Weeks 26 to 30 of Your Pregnancy: Care Instructions Your Care Instructions You are now in your last trimester of pregnancy. Your baby is growing rapidly. And you'll probably feel your baby moving around more often. Your doctor may ask you to count your baby's kicks. Your back may ache as your body gets used to your baby's size and length. If you haven't already had the Tdap shot during this pregnancy, talk to your doctor about getting it. It will help protect your  against pertussis infection. During this time, it's important to take care of yourself and pay attention to what your body needs. If you feel sexual, explore ways to be close with your partner that match your comfort and desire. Use the tips provided in this care sheet to find ways to be sexual in your own way. Follow-up care is a key part of your treatment and safety. Be sure to make and go to all appointments, and call your doctor if you are having problems. It's also a good idea to know your test results and keep a list of the medicines you take. How can you care for yourself at home? Take it easy at work · Take frequent breaks. If possible, stop working when you are tired, and rest during your lunch hour. · Take bathroom breaks every 2 hours. · Change positions often. If you sit for long periods, stand up and walk around. · When you stand for a long time, keep one foot on a low stool with your knee bent. After standing a lot, sit with your feet up. · Avoid fumes, chemicals, and tobacco smoke. Be sexual in your own way · Having sex during pregnancy is okay, unless your doctor tells you not to. · You may be very interested in sex, or you may have no interest at all. · Your growing belly can make it hard to find a good position during intercourse. [place] and explore. · You may get cramps in your uterus when your partner touches your breasts. · A back rub may relieve the backache or cramps that sometimes follow orgasm. Learn about  labor · Watch for signs of  labor. You may be going into labor if: 
¨ You have menstrual-like cramps, with or without nausea. ¨ You have about 4 or more contractions in 20 minutes, or about 8 or more within 1 hour, even after you have had a glass of water and are resting. ¨ You have a low, dull backache that does not go away when you change your position. ¨ You have pain or pressure in your pelvis that comes and goes in a pattern. ¨ You have intestinal cramping or flu-like symptoms, with or without diarrhea. ¨ You notice an increase or change in your vaginal discharge. Discharge may be heavy, mucus-like, watery, or streaked with blood. ¨ Your water breaks. · If you think you have  labor: ¨ Drink 2 or 3 glasses of water or juice. Not drinking enough fluids can cause contractions. ¨ Stop what you are doing, and empty your bladder. Then lie down on your left side for at least 1 hour. ¨ While lying on your side, find your breast bone. Put your fingers in the soft spot just below it. Move your fingers down toward your belly button to find the top of your uterus. Check to see if it is tight. ¨ Contractions can be weak or strong. Record your contractions for an hour. Time a contraction from the start of one contraction to the start of the next one. ¨ Single or several strong contractions without a pattern are called Zavala-Holden contractions. They are practice contractions but not the start of labor. They often stop if you change what you are doing. ¨ Call your doctor if you have regular contractions. Where can you learn more? Go to http://nallely-art.info/. Enter S488 in the search box to learn more about \"Weeks 26 to 30 of Your Pregnancy: Care Instructions. \" Current as of: March 16, 2017 Content Version: 11.4 © 8529-8459 [employer]. Care instructions adapted under license by [employer] (which disclaims liability or warranty for this information). If you have questions about a medical condition or this instruction, always ask your healthcare professional. [address] any warranty or liability for your use of this information. Please provide this summary of care documentation to your next provider. Your primary care clinician is listed as Sune Oar. If you have any questions after today's visit, please call [phone].

## 2018-01-02 NOTE — PATIENT INSTRUCTIONS
Weeks 26 to 30 of Your Pregnancy: Care Instructions  Your Care Instructions    You are now in your last trimester of pregnancy. Your baby is growing rapidly. And you'll probably feel your baby moving around more often. Your doctor may ask you to count your baby's kicks. Your back may ache as your body gets used to your baby's size and length. If you haven't already had the Tdap shot during this pregnancy, talk to your doctor about getting it. It will help protect your  against pertussis infection. During this time, it's important to take care of yourself and pay attention to what your body needs. If you feel sexual, explore ways to be close with your partner that match your comfort and desire. Use the tips provided in this care sheet to find ways to be sexual in your own way. Follow-up care is a key part of your treatment and safety. Be sure to make and go to all appointments, and call your doctor if you are having problems. It's also a good idea to know your test results and keep a list of the medicines you take. How can you care for yourself at home? Take it easy at work  · Take frequent breaks. If possible, stop working when you are tired, and rest during your lunch hour. · Take bathroom breaks every 2 hours. · Change positions often. If you sit for long periods, stand up and walk around. · When you stand for a long time, keep one foot on a low stool with your knee bent. After standing a lot, sit with your feet up. · Avoid fumes, chemicals, and tobacco smoke. Be sexual in your own way  · Having sex during pregnancy is okay, unless your doctor tells you not to. · You may be very interested in sex, or you may have no interest at all. · Your growing belly can make it hard to find a good position during intercourse. [place] and explore. · You may get cramps in your uterus when your partner touches your breasts.   · A back rub may relieve the backache or cramps that sometimes follow orgasm. Learn about  labor  · Watch for signs of  labor. You may be going into labor if:  ¨ You have menstrual-like cramps, with or without nausea. ¨ You have about 4 or more contractions in 20 minutes, or about 8 or more within 1 hour, even after you have had a glass of water and are resting. ¨ You have a low, dull backache that does not go away when you change your position. ¨ You have pain or pressure in your pelvis that comes and goes in a pattern. ¨ You have intestinal cramping or flu-like symptoms, with or without diarrhea. ¨ You notice an increase or change in your vaginal discharge. Discharge may be heavy, mucus-like, watery, or streaked with blood. ¨ Your water breaks. · If you think you have  labor:  ¨ Drink 2 or 3 glasses of water or juice. Not drinking enough fluids can cause contractions. ¨ Stop what you are doing, and empty your bladder. Then lie down on your left side for at least 1 hour. ¨ While lying on your side, find your breast bone. Put your fingers in the soft spot just below it. Move your fingers down toward your belly button to find the top of your uterus. Check to see if it is tight. ¨ Contractions can be weak or strong. Record your contractions for an hour. Time a contraction from the start of one contraction to the start of the next one. ¨ Single or several strong contractions without a pattern are called Des-Holden contractions. They are practice contractions but not the start of labor. They often stop if you change what you are doing. ¨ Call your doctor if you have regular contractions. Where can you learn more? Go to http://nallely-art.info/. Enter M221 in the search box to learn more about \"Weeks 26 to 30 of Your Pregnancy: Care Instructions. \"  Current as of: 2017  Content Version: 11.4  © 7597-6293 [employer].  Care instructions adapted under license by [employer] (which disclaims liability or warranty for this information). If you have questions about a medical condition or this instruction, always ask your healthcare professional. [place] [address] any warranty or liability for your use of this information.

## 2018-01-02 NOTE — PROGRESS NOTES
Doing well. Recovering from viral URI (fever last week.)  Methimazole decreased by Endo a couple of weeks ago. Gtt today. TDAP at next visit.

## 2018-01-03 LAB — GLUCOSE 1H P 50 G GLC PO SERPL-MCNC: 139 MG/DL (ref 65–129)

## 2018-01-03 NOTE — TELEPHONE ENCOUNTER
Attempted to call. Unsuccessful. Left msg for June Butcher informing her I will mail her a letter containing reason for call. A callback number was left in case of any questions or concerns. Letter mailed.

## 2018-01-10 ENCOUNTER — TELEPHONE (OUTPATIENT)
Dept: OBGYN CLINIC | Age: 33
End: 2018-01-10

## 2018-01-10 NOTE — TELEPHONE ENCOUNTER
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 She is calling, returning someone's call regarding lab work. She also has yellow thin discharge that is causing a lot of vaginal skin irritation. She states that she must wear a pad daily for the past week since it started. No odor. Patient advised needs 3 hr glucose testing. Set up for lab only 1/15/18 8:30 am.  Advised to be fasting, npo after midnight, only water. Bring protein snack. Patient placed on schedule for problem visit only for vag d/c. Advised patient that this would not be for lab work regarding blood glucose. Patient expressed verbal understanding.   She is scheduled for 18 at 3:30 pm per patient request.

## 2018-01-11 ENCOUNTER — ROUTINE PRENATAL (OUTPATIENT)
Dept: OBGYN CLINIC | Age: 33
End: 2018-01-11

## 2018-01-11 VITALS
HEIGHT: 65 IN | BODY MASS INDEX: 29.89 KG/M2 | DIASTOLIC BLOOD PRESSURE: 72 MMHG | SYSTOLIC BLOOD PRESSURE: 110 MMHG | WEIGHT: 179.4 LBS

## 2018-01-11 DIAGNOSIS — N89.8 VAGINAL DISCHARGE: Primary | ICD-10-CM

## 2018-01-11 NOTE — PROGRESS NOTES
Vaginitis    Gregor Serrano is a 29 y.o. female who presents for the evaluation of vaginal discharge. Description:  Color?  yellow  Odor? no  Texture? thick    Associated symptoms:   Pruritis? yes  Irritation? yes  Dysuria?no  Bleeding/spotting? no  Pain? no  Lesions ? no    Hygeine:  -new soaps or detergents? no  -new clothing? no  -douching or hygiene products? no    Risk factors:  -sexually active? Yes  -# of partners 1  -gender of partners: husbancd  -uses condoms or barrier contraception: always, sometimes, never  -known exposure? Previous STD history: none    Prior yeast infx or BV? no      Past Medical History:   Diagnosis Date    Abnormal Papanicolaou smear of cervix 2007    pt states follow-up normal    Anemia     Endocrine disease     hypothyroid    Goiter     Graves disease     Hypothyroid     Prediabetes     Retinal detachment     Routine Papanicolaou smear 10/31/2016    Negative (No ECC) No HPV         Past Surgical History:   Procedure Laterality Date    HX HEENT      jaw surgery    HX TUMOR REMOVAL      facial tumor removed       Family History   Problem Relation Age of Onset    Diabetes Father        Social History     Social History    Marital status:      Spouse name: N/A    Number of children: N/A    Years of education: N/A     Occupational History    Not on file. Social History Main Topics    Smoking status: Never Smoker    Smokeless tobacco: Never Used      Comment: Never used vapor or e-cigs     Alcohol use No    Drug use: No    Sexual activity: Yes     Partners: Male     Birth control/ protection: None     Other Topics Concern    Not on file     Social History Narrative       Current Outpatient Prescriptions   Medication Sig Dispense Refill    calcium carbonate (TUMS) 200 mg calcium (500 mg) chew Take 1 Tab by mouth daily.  PNV38-Iron Cbn&Gluc-FA-DSS-DHA 35-1- mg cmpk Take  by mouth.       calcium carbonate (OS-MURALI) 500 mg calcium (1,250 mg) tablet Take  by mouth daily.  magnesium 250 mg tab Take  by mouth.  methIMAzole (TAPAZOLE) 5 mg tablet 2.5 mg daily every other day 30 Tab 11    sod bicarb-sod chlor-neti pot (SINUS [address] NETIPOT) pkdv 1 Package by Nasal route two (2) times daily as needed.  For congestion  Indications: Nasal Congestion 30 Each 0       Allergies   Allergen Reactions    Ibuprofen Swelling     Eyes           Review of Systems - History obtained from the patient  Constitutional: negative for weight loss, fever, night sweats  HEENT: negative for hearing loss, earache, congestion, snoring, sorethroat  CV: negative for chest pain, palpitations, edema  Resp: negative for cough, shortness of breath, wheezing  Breast: negative for breast lumps, nipple discharge, galactorrhea  GI: negative for change in bowel habits, abdominal pain, black or bloody stools  : negative for frequency, dysuria, hematuria, +vaginal discharge  MSK: negative for back pain, joint pain, muscle pain  Skin: negative for itching, rash, hives  Neuro: negative for dizziness, headache, confusion, weakness  Psych: negative for anxiety, depression, change in mood  Heme/lymph: negative for bleeding, bruising, pallor      Objective:    Visit Vitals    /72 (BP 1 Location: Left arm, BP Patient Position: Sitting)    Ht 5' 5\" (1.651 m)    Wt 179 lb 6.4 oz (81.4 kg)    LMP 06/28/2017 (Exact Date)    BMI 29.85 kg/m2       PHYSICAL EXAMINATION    Constitutional  · Appearance: well-nourished, well developed, alert, in no acute distress    HENT  · Head and Face: appears normal    Gastrointestinal  · Abdominal Examination: abdomen non-tender to palpation, gravid,  normal bowel sounds, no masses present  · Liver and spleen: no hepatomegaly present, spleen not palpable  · Hernias: no hernias identified    Genitourinary  · External Genitalia: normal appearance for age,no tenderness present, no inflammatory lesions present, no masses present, no atrophy present  · Vagina: normal vaginal vault without central or paravaginal defects, no inflammatory lesions present, no masses present, +vaginal discharge  · Bladder: non-tender to palpation  · Urethra: appears normal  · Cervix: normal, no cervicitis, no CMT  · Uterus: normal size, shape and consistency, mobile  · Adnexa: no adnexal tenderness present, no adnexal masses present  · Perineum: perineum within normal limits, no evidence of trauma, no rashes or skin lesions present    Skin  · General Inspection: no rash, no lesions identified    Neurologic/Psychiatric  · Mental Status:  · Orientation: grossly oriented to person, place and time  · Mood and Affect: mood normal, affect appropriate    Assessment/Plan:    29 y.o. female presenting for evaluation of vaginal discharge. Vaginal discharge:  Vaginitis plus sent. Avoid IC now. Advised trial of OTC monistat. Discussed safe medications for heartburn in pregnancy.       Dario Ty MD  1/11/2018  5:53 PM

## 2018-01-14 LAB
A VAGINAE DNA VAG QL NAA+PROBE: NORMAL SCORE
BVAB2 DNA VAG QL NAA+PROBE: NORMAL SCORE
C ALBICANS DNA VAG QL NAA+PROBE: NEGATIVE
C GLABRATA DNA VAG QL NAA+PROBE: NEGATIVE
C TRACH RRNA SPEC QL NAA+PROBE: NEGATIVE
MEGA1 DNA VAG QL NAA+PROBE: NORMAL SCORE
N GONORRHOEA RRNA SPEC QL NAA+PROBE: NEGATIVE
T VAGINALIS RRNA SPEC QL NAA+PROBE: NEGATIVE

## 2018-01-15 ENCOUNTER — LAB ONLY (OUTPATIENT)
Dept: OBGYN CLINIC | Age: 33
End: 2018-01-15

## 2018-01-15 DIAGNOSIS — R73.09 ELEVATED GLUCOSE: Primary | ICD-10-CM

## 2018-01-15 NOTE — LETTER
1/15/2018 9:17 AM 
 
[id number] [facility] [id number] To Whom it may concern: 
 
 
Fei Dawn is currently under my care for pregnancy. Her DEISI is April 4, 2018. Sincerely, Shanda Bianchi MD

## 2018-01-16 LAB
GLUCOSE 1H P 100 G GLC PO SERPL-MCNC: 161 MG/DL (ref 65–179)
GLUCOSE 2H P 100 G GLC PO SERPL-MCNC: 103 MG/DL (ref 65–154)
GLUCOSE 3H P 100 G GLC PO SERPL-MCNC: 123 MG/DL (ref 65–139)
GLUCOSE P FAST SERPL-MCNC: 86 MG/DL (ref 65–94)
NOTE:, 102047: NORMAL

## 2018-01-23 ENCOUNTER — TELEPHONE (OUTPATIENT)
Dept: OBGYN CLINIC | Age: 33
End: 2018-01-23

## 2018-01-23 ENCOUNTER — HOSPITAL ENCOUNTER (EMERGENCY)
Age: 33
Discharge: HOME | End: 2018-01-23
Attending: FAMILY MEDICINE

## 2018-01-23 ENCOUNTER — HOSPITAL ENCOUNTER (OUTPATIENT)
Dept: LAB | Age: 33
Discharge: HOME | End: 2018-01-23

## 2018-01-23 VITALS
OXYGEN SATURATION: 97 % | HEART RATE: 116 BPM | SYSTOLIC BLOOD PRESSURE: 126 MMHG | WEIGHT: 175.2 LBS | DIASTOLIC BLOOD PRESSURE: 56 MMHG | HEIGHT: 65 IN | RESPIRATION RATE: 20 BRPM | TEMPERATURE: 98.1 F | BODY MASS INDEX: 29.19 KG/M2

## 2018-01-23 DIAGNOSIS — J10.1 INFLUENZA A: Primary | ICD-10-CM

## 2018-01-23 LAB
INFLUENZA A AG (POC): POSITIVE
INFLUENZA AG (POC) NEGATIVE CTRL.: ABNORMAL
INFLUENZA AG (POC) POSITIVE CTRL.: ABNORMAL
INFLUENZA B AG (POC): NEGATIVE
LOT NUMBER POC: ABNORMAL

## 2018-01-23 PROCEDURE — 87070 CULTURE OTHR SPECIMN AEROBIC: CPT | Performed by: FAMILY MEDICINE

## 2018-01-23 RX ORDER — OSELTAMIVIR PHOSPHATE 75 MG/1
75 CAPSULE ORAL 2 TIMES DAILY
Qty: 10 CAP | Refills: 0 | Status: SHIPPED | OUTPATIENT
Start: 2018-01-23 | End: 2018-01-28

## 2018-01-23 NOTE — DISCHARGE INSTRUCTIONS

## 2018-01-23 NOTE — LETTER
[facility] 
[address] [address] 
[phone] Work/School Note Date: 1/23/2018 To Whom It May concern: 
 
Madeline Webster was seen and treated today in the urgent care center by the following provider(s): 
No providers found. Madeline Webster may return to work on 1/29/2018. Sincerely, Bambi Alex MD

## 2018-01-23 NOTE — TELEPHONE ENCOUNTER
Patient's , Sandor Webster (hipaa cleared for all access) is calling to say that his wife is on the 2nd day of fever 101.7 and cough. Fever responds to Tylenol. She has been exposed by coworker with flu. Patient is 34 w 6 days pregnant, . Advised patient's , needs to schedule appointment for flu testing with PCP,  Call prn.

## 2018-01-23 NOTE — UC PROVIDER NOTE
Patient is a 34 y.o. female presenting with cold symptoms. The history is provided by the patient. Cold Symptoms    This is a new (30 wks pregnant) problem. The current episode started 2 days ago. The problem has been gradually worsening. Patient reports a subjective fever - was not measured. The fever has been present for 1 - 2 days. Associated symptoms include congestion, headaches, rhinorrhea, sore throat and cough. Pertinent negatives include no chest pain, no ear pain and no wheezing. Treatments tried: tylenol. The treatment provided no relief. Past Medical History:   Diagnosis Date    Abnormal Papanicolaou smear of cervix 2007    pt states follow-up normal    Anemia     Endocrine disease     hypothyroid    Goiter     Graves disease     Hypothyroid     Prediabetes     Retinal detachment     Routine Papanicolaou smear 10/31/2016    Negative (No ECC) No HPV         Past Surgical History:   Procedure Laterality Date    HX HEENT      jaw surgery    HX TUMOR REMOVAL      facial tumor removed         Family History   Problem Relation Age of Onset    Diabetes Father         Social History     Social History    Marital status:      Spouse name: N/A    Number of children: N/A    Years of education: N/A     Occupational History    Not on file. Social History Main Topics    Smoking status: Never Smoker    Smokeless tobacco: Never Used      Comment: Never used vapor or e-cigs     Alcohol use No    Drug use: No    Sexual activity: Yes     Partners: Male     Birth control/ protection: None     Other Topics Concern    Not on file     Social History Narrative                ALLERGIES: Ibuprofen    Review of Systems   Constitutional: Positive for chills and fever. HENT: Positive for congestion, rhinorrhea and sore throat. Negative for ear pain. Respiratory: Positive for cough. Negative for shortness of breath and wheezing. Cardiovascular: Negative for chest pain and palpitations. Neurological: Positive for headaches. Vitals:    01/23/18 1454   BP: 126/56   Pulse: (!) 116   Resp: 20   Temp: 98.1 °F (36.7 °C)   SpO2: 97%   Weight: 79.5 kg (175 lb 3.2 oz)   Height: 5' 5\" (1.651 m)       Physical Exam   Constitutional: She appears well-developed and well-nourished. No distress. HENT:   Right Ear: Tympanic membrane, external ear and ear canal normal.   Left Ear: Tympanic membrane, external ear and ear canal normal.   Nose: Rhinorrhea present. Right sinus exhibits no maxillary sinus tenderness and no frontal sinus tenderness. Left sinus exhibits no maxillary sinus tenderness and no frontal sinus tenderness. Mouth/Throat: Mucous membranes are normal. Posterior oropharyngeal erythema present. No oropharyngeal exudate, posterior oropharyngeal edema or tonsillar abscesses. Cardiovascular: Normal rate, regular rhythm and normal heart sounds. Pulmonary/Chest: Effort normal and breath sounds normal. No respiratory distress. She has no wheezes. She has no rales. Lymphadenopathy:     She has cervical adenopathy. Neurological: She is alert. Skin: She is not diaphoretic. Psychiatric: She has a normal mood and affect. Her behavior is normal. Judgment and thought content normal.   Nursing note and vitals reviewed. MDM     Differential Diagnosis; Clinical Impression; Plan:     CLINICAL IMPRESSION:  Influenza A  (primary encounter diagnosis)    Plan:  1. Tamiflu  2. Tylenol prn  3. Fluids  4. ER if worse  Amount and/or Complexity of Data Reviewed:   Clinical lab tests:  Ordered and reviewed  Risk of Significant Complications, Morbidity, and/or Mortality:   Presenting problems: Moderate  Diagnostic procedures: Moderate  Management options:   Moderate  Progress:   Patient progress:  Stable      Procedures

## 2018-01-25 LAB
BACTERIA SPEC CULT: NORMAL
SERVICE CMNT-IMP: NORMAL

## 2018-01-30 ENCOUNTER — ROUTINE PRENATAL (OUTPATIENT)
Dept: OBGYN CLINIC | Age: 33
End: 2018-01-30

## 2018-01-30 VITALS
HEIGHT: 65 IN | RESPIRATION RATE: 16 BRPM | SYSTOLIC BLOOD PRESSURE: 108 MMHG | WEIGHT: 175.4 LBS | DIASTOLIC BLOOD PRESSURE: 62 MMHG | BODY MASS INDEX: 29.22 KG/M2

## 2018-01-30 DIAGNOSIS — Z34.03 ENCOUNTER FOR SUPERVISION OF NORMAL FIRST PREGNANCY IN THIRD TRIMESTER: ICD-10-CM

## 2018-01-30 NOTE — LETTER
NOTIFICATION OF RETURN TO WORK / SCHOOL 
 
1/30/2018 Ms. Rosanne [id number] [place] [id number] To Whom It May Concern: 
 
Rosanne Maya was under the care of [facility]. She will return to work  on 01/20 with restrictions. 15 minute break every 2 hours. If there are questions or concerns please have the patient contact our office. Sincerely, Gretel Snider MD

## 2018-01-30 NOTE — PROGRESS NOTES
Follow-up OB visit    Chief Complaint   Patient presents with    Routine Prenatal Visit     30w6d       Vitals:    18 0808   BP: 108/62   Resp: 16   Weight: 175 lb 6.4 oz (79.6 kg)   Height: 5' 5\" (1.651 m)       Patient Active Problem List    Diagnosis Date Noted    Supervision of normal first pregnancy 08/15/2017    Goiter diffuse 2014    GERD (gastroesophageal reflux disease) 2013    Tinnitus 2013    Prediabetes 2013    Metrorrhagia 2013   Cleo Salk' disease 2013       The patient reports the following concerns: Patient had flu last week. Tested positive. See PN flowsheet for exam    34 y.o.  30w6d   No diagnosis found.      [] SAB/bleeding precautions reviewed   [] PTL/PPROM precautions reviewed   [] Labor precautions reviewed   [] Fetal kick counts discussed   [] Labs reviewed with patient   [] Moise Cotton precautions reviewed   [] Consent reviewed   [] Handouts given to pt   [] Glucola handout    [] GBS/labor/Magic Hour handout   []    []    []    []    Follow-up Disposition: Not on File

## 2018-02-13 ENCOUNTER — ROUTINE PRENATAL (OUTPATIENT)
Dept: OBGYN CLINIC | Age: 33
End: 2018-02-13

## 2018-02-13 VITALS — SYSTOLIC BLOOD PRESSURE: 122 MMHG | WEIGHT: 179 LBS | DIASTOLIC BLOOD PRESSURE: 70 MMHG | BODY MASS INDEX: 29.79 KG/M2

## 2018-02-13 DIAGNOSIS — Z34.03 ENCOUNTER FOR SUPERVISION OF NORMAL FIRST PREGNANCY IN THIRD TRIMESTER: ICD-10-CM

## 2018-02-21 LAB
T4 FREE SERPL-MCNC: 1.01 NG/DL (ref 0.82–1.77)
TSH SERPL DL<=0.005 MIU/L-ACNC: 0.13 UIU/ML (ref 0.45–4.5)

## 2018-02-22 ENCOUNTER — TELEPHONE (OUTPATIENT)
Dept: ENDOCRINOLOGY | Age: 33
End: 2018-02-22

## 2018-02-22 ENCOUNTER — OFFICE VISIT (OUTPATIENT)
Dept: ENDOCRINOLOGY | Age: 33
End: 2018-02-22

## 2018-02-22 VITALS
SYSTOLIC BLOOD PRESSURE: 115 MMHG | WEIGHT: 182.5 LBS | DIASTOLIC BLOOD PRESSURE: 62 MMHG | OXYGEN SATURATION: 99 % | BODY MASS INDEX: 30.41 KG/M2 | HEIGHT: 65 IN | RESPIRATION RATE: 14 BRPM | HEART RATE: 72 BPM | TEMPERATURE: 97.5 F

## 2018-02-22 DIAGNOSIS — E04.0 GOITER DIFFUSE: ICD-10-CM

## 2018-02-22 DIAGNOSIS — E05.00 GRAVES' DISEASE: Primary | ICD-10-CM

## 2018-02-22 NOTE — PROGRESS NOTES
Kendra Delgado is a 34 y.o. female here for   Chief Complaint   Patient presents with    Thyroid Problem       1. Have you been to the ER, urgent care clinic since your last visit? Hospitalized since your last visit? -no    2. Have you seen or consulted any other health care providers outside of the [address] [place] [address] since your last visit? Include any pap smears or colon screening. -OB Gyn    Wt Readings from Last 3 Encounters:   02/13/18 179 lb (81.2 kg)   01/30/18 175 lb 6.4 oz (79.6 kg)   01/23/18 175 lb 3.2 oz (79.5 kg)     Temp Readings from Last 3 Encounters:   01/23/18 98.1 °F (36.7 °C)   12/28/17 98.3 °F (36.8 °C)   12/27/17 98.1 °F (36.7 °C) (Oral)     BP Readings from Last 3 Encounters:   02/13/18 122/70   01/30/18 108/62   01/23/18 126/56     Pulse Readings from Last 3 Encounters:   01/23/18 (!) 116   12/28/17 95   12/27/17 (!) 112

## 2018-02-22 NOTE — MR AVS SNAPSHOT
[address] [facility] [address] 
[phone] Patient: Milagro Contreras MRN: [id number] HRF:8/23/7829 Visit Information Date & Time Provider Department Dept. Phone Encounter #  
 2/22/2018 10:00 AM Iván Mcgill MD [facility] Diabetes & Endocrinology [phone] [id number] Follow-up Instructions Return in about 4 months (around 6/22/2018). 2/27/2018  8:10 AM  
OB VISIT with Lucy Collins MD  
[address] [facility] ([address] [facility]) Appt Note: [address], [place] [address]  
[address] [facility], [facility] [place] [address] Upcoming Health Maintenance Date Due DTaP/Tdap/Td series (1 - Tdap) 1/18/2008 OB 3RD TRIMESTER TDAP 1/3/2018 PAP AKA CERVICAL CYTOLOGY 8/15/2020 Allergies as of 2/22/2018  Review Complete On: 2/22/2018 By: Iván Mcgill MD  
  
 Severity Noted Reaction Type Reactions Ibuprofen  03/24/2014    Swelling Eyes Current Immunizations  Reviewed on 6/24/2013 Name Date  
 TB Skin Test (PPD) Intradermal 6/24/2013 Not reviewed this visit You Were Diagnosed With   
  
 Codes Comments Goiter diffuse    -  Primary ICD-10-CM: E04.9 ICD-9-CM: 240.9 Graves' disease     ICD-10-CM: E05.00 ICD-9-CM: 242.00 Vitals BP Pulse Temp Resp Height(growth percentile) Weight(growth percentile)  
 115/62 (BP 1 Location: Left arm, BP Patient Position: Sitting) 72 97.5 °F (36.4 °C) (Oral) 14 5' 5\" (1.651 m) 182 lb 8 oz (82.8 kg) LMP SpO2 BMI OB Status Smoking Status 06/28/2017 (Exact Date) 99% 30.37 kg/m2 Pregnant Never Smoker Vitals History BMI and BSA Data Body Mass Index Body Surface Area  
 30.37 kg/m 2 1.95 m 2 Preferred Pharmacy Pharmacy Name Phone [facility] [place] [facility] [place], [address] [phone] Your Updated Medication List  
 This list is accurate as of 2/22/18 10:55 AM.  Always use your most recent med list.  
  
  
  
  
 calcium carbonate 200 mg calcium (500 mg) Cheryn Goldmann Commonly known as:  TUMS Take 1 Tab by mouth daily. calcium carbonate 500 mg calcium (1,250 mg) tablet Commonly known as:  OS-MURALI Take  by mouth daily. magnesium 250 mg Tab Take  by mouth. methIMAzole 5 mg tablet Commonly known as:  TAPAZOLE  
2.5 mg daily every other day PNV38-Iron Cbn&Gluc-FA-DSS-DHA 35-1- mg Cmpk Take  by mouth.  
  
 sod bicarb-sod chlor-neti pot Pkdv Commonly known as:  SINUS [address] NETIPOT  
1 Package by Nasal route two (2) times daily as needed. For congestion  Indications: Nasal Congestion We Performed the Following TSH RECEPTOR AB [27586 CPT(R)] Follow-up Instructions Return in about 4 months (around 6/22/2018). Please provide this summary of care documentation to your next provider. Your primary care clinician is listed as Keena Bernard. If you have any questions after today's visit, please call [phone].

## 2018-02-22 NOTE — PROGRESS NOTES
Chief Complaint   Patient presents with    Thyroid Problem       History of present illness    Randi Sterling is a 34 y.o. female  here for fu of hyperthyroidism. Graves disease - on Methimazole  Compliant with MMI   32 weeks pregnant  DEISI 4/4/2018   On MMI   She is on methimazole  No fever or sore throat. She is followed by perinatologist      Goiter - no decrease in size    No dysphagia,dysphonia or dyspnea. Graves Dx in 2009 when she was in [facility], treated for a year and it recurred a year later, then she was Rx with PTU till 5/13, she was changed to [address] [place] [address] family   No FH of thyroid disease. No FH of thyroid cancer     Wt Readings from Last 3 Encounters:   02/22/18 182 lb 8 oz (82.8 kg)   02/13/18 179 lb (81.2 kg)   01/30/18 175 lb 6.4 oz (79.6 kg)       Past Medical History:   Diagnosis Date    Abnormal Papanicolaou smear of cervix 2007    pt states follow-up normal    Anemia     Endocrine disease     hypothyroid    Goiter     Graves disease     Hypothyroid     Prediabetes     Retinal detachment     Routine Papanicolaou smear 10/31/2016    Negative (No ECC) No HPV        Current Outpatient Prescriptions   Medication Sig    calcium carbonate (TUMS) 200 mg calcium (500 mg) chew Take 1 Tab by mouth daily.  PNV38-Iron Cbn&Gluc-FA-DSS-DHA 35-1- mg cmpk Take  by mouth.  magnesium 250 mg tab Take  by mouth.  methIMAzole (TAPAZOLE) 5 mg tablet 2.5 mg daily every other day    sod bicarb-sod chlor-neti pot (S[address]US [address] NETIPOT) pkdv 1 Package by Nasal route two (2) times daily as needed. For congestion  Indications: Nasal Congestion    calcium carbonate (OS-MURALI) 500 mg calcium (1,250 mg) tablet Take  by mouth daily. No current facility-administered medications for this visit.           Review of Systems:  - Constitutional Symptoms: no fevers, chills, weight loss  - Eyes: no blurry vision or double vision  - Cardiovascular: no chest pain or palpitations  - Respiratory: no cough or shortness of breath  - Gastrointestinal: no dysphagia or abdominal pain  - Musculoskeletal: + joint pains or weakness  - Integumentary: no rashes  -     Physical Examination:  - Blood pressure 115/62, pulse 72, temperature 97.5 °F (36.4 °C), temperature source Oral, resp. rate 14, height 5' 5\" (1.651 m), weight 182 lb 8 oz (82.8 kg), last menstrual period 06/28/2017, SpO2 99 %. Body mass index is 30.37 kg/(m^2). - General: pleasant, no distress, good eye contact  - HEENT: no exopthalmos, no periorbital edema, no scleral/conjunctival injection, EOMI, no lid lag or stare  - Neck: supple, thyromegaly 2 times the size   - Cardiovascular: regular, normal rate, normal S1 and S2  - Respiratory: clear to auscultation bilaterally  - Gastrointestinal: soft, nontender, nondistended, BS +  - Musculoskeletal: no tremors, no edema  - Neurological: alert and oriented   - Psychiatric: normal mood and affect  - Skin - normal turgor    Data Reviewed:      Ref. Range 11/8/2012 22:15 12/11/2012 10:31 5/16/2013 08:55   T4, Free Latest Range: 0.82-1.77 ng/dL 2.1 (H) 1.0 0.73 (L)   T3, total Latest Range:  ng/dL   119   TSH Latest Range: 0.450-4.500 uIU/mL <0.01 (L) <0.01 (L) 4.230       Lab Results   Component Value Date/Time    WBC 12.1 (H) 08/15/2017 02:28 PM    HGB 11.6 08/15/2017 02:28 PM    HCT 36.1 08/15/2017 02:28 PM    PLATELET 309 08/26/9969 02:28 PM    MCV 80 08/15/2017 02:28 PM     Lab Results   Component Value Date/Time    AST (SGOT) 10 04/21/2017 10:48 AM    Alk. phosphatase 39 04/21/2017 10:48 AM     Lab Results   Component Value Date/Time    TSH 0.132 (L) 02/20/2018 01:36 PM    T4, Free 1.01 02/20/2018 01:36 PM        [x] Reviewed labs    Assessment/Plan:     1. Grave's disease - stopped MMI 12/16, relapsed and hence restarted   2. Goiter - US 7/15 no nodules  3. Hx of retinal detachment -   4. [address]  5.  Prediabetes for A1 C-lifestyle changes     Lab Results   Component Value Date/Time    Hemoglobin A1c 5.4 08/03/2017 10:47 AM    Hemoglobin A1c (POC) 5.2 07/28/2015 02:30 PM       Methimazole- methimazole to 2.5 mg every other day. She was on PTU during early part of pregnancy and now on methimazole. Given the size of the gland and TRab she needs medication longer. Not interested in ANDERSEN when discussed before pregnancy    MOnitor labs in 2 months     Thank you for allowing me to participate in the care of this patient.     Jayy Canada MD

## 2018-02-23 LAB — TSH RECEP AB SER-ACNC: 0.74 IU/L (ref 0–1.75)

## 2018-02-27 ENCOUNTER — ROUTINE PRENATAL (OUTPATIENT)
Dept: OBGYN CLINIC | Age: 33
End: 2018-02-27

## 2018-02-27 VITALS — DIASTOLIC BLOOD PRESSURE: 62 MMHG | WEIGHT: 182.6 LBS | SYSTOLIC BLOOD PRESSURE: 102 MMHG | BODY MASS INDEX: 30.39 KG/M2

## 2018-02-27 DIAGNOSIS — Z3A.34 34 WEEKS GESTATION OF PREGNANCY: ICD-10-CM

## 2018-02-27 DIAGNOSIS — Z23 ENCOUNTER FOR IMMUNIZATION: Primary | ICD-10-CM

## 2018-02-27 NOTE — PROGRESS NOTES
Ulysses Galeano is a 34 y.o. female  After obtaining consent, and per verbal order from Dr. Mariposa Mohan, patient received T-Dap vaccine given by Pocatello Brochterrence, LPN in Right Deltoid. T-Dap 0.5 mL IM now. Patient was observed for 10 minutes post injection. Patient tolerated injection well.

## 2018-02-27 NOTE — PROGRESS NOTES
Doing well. Work adjusted schedule (made it worse). She will bring in note tomorrow for us to sign re what restrictions she would like. Discussed breastfeeding ok on her current low dose of methimazole. TDAP given. PTL precautions.

## 2018-03-06 ENCOUNTER — HOSPITAL ENCOUNTER (OUTPATIENT)
Age: 33
Setting detail: OBSERVATION
Discharge: HOME | End: 2018-03-07
Attending: OBSTETRICS & GYNECOLOGY | Admitting: OBSTETRICS & GYNECOLOGY
Payer: COMMERCIAL

## 2018-03-06 ENCOUNTER — APPOINTMENT (OUTPATIENT)
Dept: GENERAL RADIOLOGY | Age: 33
End: 2018-03-06
Attending: PHYSICIAN ASSISTANT
Payer: COMMERCIAL

## 2018-03-06 ENCOUNTER — HOSPITAL ENCOUNTER (EMERGENCY)
Age: 33
Discharge: HOME | End: 2018-03-06
Attending: EMERGENCY MEDICINE
Payer: COMMERCIAL

## 2018-03-06 VITALS
TEMPERATURE: 98.7 F | OXYGEN SATURATION: 99 % | WEIGHT: 182 LBS | HEART RATE: 86 BPM | HEIGHT: 65 IN | RESPIRATION RATE: 16 BRPM | SYSTOLIC BLOOD PRESSURE: 143 MMHG | BODY MASS INDEX: 30.32 KG/M2 | DIASTOLIC BLOOD PRESSURE: 87 MMHG

## 2018-03-06 DIAGNOSIS — R07.89 CHEST WALL PAIN: ICD-10-CM

## 2018-03-06 DIAGNOSIS — S80.02XA CONTUSION OF LEFT KNEE, INITIAL ENCOUNTER: ICD-10-CM
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
## 2018-03-06 PROCEDURE — 99284 EMERGENCY DEPT VISIT MOD MDM: CPT

## 2018-03-06 PROCEDURE — 73562 X-RAY EXAM OF KNEE 3: CPT

## 2018-03-06 PROCEDURE — 74011250637 HC RX REV CODE- 250/637: Performed by: PHYSICIAN ASSISTANT

## 2018-03-06 PROCEDURE — 71045 X-RAY EXAM CHEST 1 VIEW: CPT

## 2018-03-06 PROCEDURE — 93005 ELECTROCARDIOGRAM TRACING: CPT

## 2018-03-06 RX ORDER — ACETAMINOPHEN 325 MG/1
650 TABLET ORAL
Status: COMPLETED | OUTPATIENT
Start: 2018-03-06 | End: 2018-03-06

## 2018-03-06 RX ADMIN — ACETAMINOPHEN 650 MG: 325 TABLET, FILM COATED ORAL at 22:11
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
## 2018-03-06 NOTE — IP AVS SNAPSHOT
[address] [facility] [address] [place] [address] 
[phone] Patient: Jomar Bueno MRN: [id number] BBA:1/76/5003 A check barbara indicates which time of day the medication should be taken. My Medications ASK your doctor about these medications Instructions Each Dose to Equal  
 Morning Noon Evening Bedtime  
 calcium carbonate 200 mg calcium (500 mg) Chew Commonly known as:  TUMS Your last dose was: Your next dose is: Take 1 Tab by mouth daily. 1 Tab  
    
   
   
   
  
 calcium carbonate 500 mg calcium (1,250 mg) tablet Commonly known as:  OS-MURALI Your last dose was: Your next dose is: Take  by mouth daily. magnesium 250 mg Tab Your last dose was: Your next dose is: Take  by mouth. methIMAzole 5 mg tablet Commonly known as:  TAPAZOLE Your last dose was: Your next dose is:    
   
   
 2.5 mg daily every other day PNV38-Iron Cbn&Gluc-FA-DSS-DHA 35-1- mg Cmpk Your last dose was: Your next dose is: Take  by mouth.  
     
   
   
   
  
 sod bicarb-sod chlor-neti pot Pkdv Commonly known as:  SINUS [address] NETIPOT Your last dose was: Your next dose is:    
   
   
 1 Package by Nasal route two (2) times daily as needed. For congestion  Indications: Nasal Congestion 1 Package

## 2018-03-06 NOTE — LETTER
[address] 
[facility] [address] 
[phone] Work/School Note Date: 3/6/2018 To Whom It May concern: 
 
Nallely Uribe was seen and treated today in the emergency room by the following provider(s): 
Attending Provider: Bennett Wright MD 
Physician Assistant: [place], [place]. Nallely Uribe may return to work on 3/9/2018.  
 
Sincerely, 
 
 
 
 
Sujata Guillen RN

## 2018-03-07 VITALS
SYSTOLIC BLOOD PRESSURE: 125 MMHG | HEIGHT: 65 IN | RESPIRATION RATE: 18 BRPM | DIASTOLIC BLOOD PRESSURE: 54 MMHG | BODY MASS INDEX: 30.32 KG/M2 | HEART RATE: 88 BPM | TEMPERATURE: 98.2 F | WEIGHT: 182 LBS
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
## 2018-03-07 LAB
APTT PPP: 26.8 SEC (ref 22.1–32)
ATRIAL RATE: 88 BPM
CALCULATED P AXIS, ECG09: 25 DEGREES
CALCULATED R AXIS, ECG10: 21 DEGREES
CALCULATED T AXIS, ECG11: 8 DEGREES
DIAGNOSIS, 93000: NORMAL
FIBRINOGEN PPP-MCNC: 503 MG/DL (ref 200–475)
INR PPP: 0.9 (ref 0.9–1.1)
P-R INTERVAL, ECG05: 130 MS
PROTHROMBIN TIME: 9.4 SEC (ref 9–11.1)
Q-T INTERVAL, ECG07: 338 MS
QRS DURATION, ECG06: 72 MS
QTC CALCULATION (BEZET), ECG08: 408 MS
THERAPEUTIC RANGE,PTTT: NORMAL SECS (ref 58–77)
VENTRICULAR RATE, ECG03: 88 BPM

## 2018-03-07 PROCEDURE — 85730 THROMBOPLASTIN TIME PARTIAL: CPT | Performed by: OBSTETRICS & GYNECOLOGY

## 2018-03-07 PROCEDURE — 74011250636 HC RX REV CODE- 250/636: Performed by: OBSTETRICS & GYNECOLOGY

## 2018-03-07 PROCEDURE — 85384 FIBRINOGEN ACTIVITY: CPT | Performed by: OBSTETRICS & GYNECOLOGY

## 2018-03-07 PROCEDURE — 96361 HYDRATE IV INFUSION ADD-ON: CPT

## 2018-03-07 PROCEDURE — 99218 HC RM OBSERVATION: CPT

## 2018-03-07 PROCEDURE — 99285 EMERGENCY DEPT VISIT HI MDM: CPT

## 2018-03-07 PROCEDURE — 74011250637 HC RX REV CODE- 250/637: Performed by: ADVANCED PRACTICE MIDWIFE

## 2018-03-07 PROCEDURE — 85610 PROTHROMBIN TIME: CPT | Performed by: OBSTETRICS & GYNECOLOGY

## 2018-03-07 PROCEDURE — 96360 HYDRATION IV INFUSION INIT: CPT

## 2018-03-07 PROCEDURE — 74011250637 HC RX REV CODE- 250/637: Performed by: OBSTETRICS & GYNECOLOGY

## 2018-03-07 PROCEDURE — 36415 COLL VENOUS BLD VENIPUNCTURE: CPT | Performed by: ADVANCED PRACTICE MIDWIFE

## 2018-03-07 RX ORDER — CALCIUM CARBONATE 200(500)MG
200 TABLET,CHEWABLE ORAL
Status: DISCONTINUED | OUTPATIENT
Start: 2018-03-07 | End: 2018-03-08 | Stop reason: HOSPADM

## 2018-03-07 RX ORDER — SODIUM CHLORIDE, SODIUM LACTATE, POTASSIUM CHLORIDE, CALCIUM CHLORIDE 600; 310; 30; 20 MG/100ML; MG/100ML; MG/100ML; MG/100ML
125 INJECTION, SOLUTION INTRAVENOUS CONTINUOUS
Status: DISCONTINUED | OUTPATIENT
Start: 2018-03-07 | End: 2018-03-07

## 2018-03-07 RX ORDER — HYDROCODONE BITARTRATE AND ACETAMINOPHEN 5; 325 MG/1; MG/1
1-2 TABLET ORAL
Status: DISCONTINUED | OUTPATIENT
Start: 2018-03-07 | End: 2018-03-08 | Stop reason: HOSPADM

## 2018-03-07 RX ORDER — ACETAMINOPHEN 325 MG/1
650 TABLET ORAL
Status: DISCONTINUED | OUTPATIENT
Start: 2018-03-07 | End: 2018-03-08 | Stop reason: HOSPADM

## 2018-03-07 RX ORDER — CALCIUM CARBONATE 200(500)MG
400 TABLET,CHEWABLE ORAL AS NEEDED
Status: DISCONTINUED | OUTPATIENT
Start: 2018-03-07 | End: 2018-03-08 | Stop reason: HOSPADM

## 2018-03-07 RX ADMIN — ACETAMINOPHEN 650 MG: 325 TABLET, FILM COATED ORAL at 04:38

## 2018-03-07 RX ADMIN — SODIUM CHLORIDE, SODIUM LACTATE, POTASSIUM CHLORIDE, AND CALCIUM CHLORIDE 125 ML/HR: 600; 310; 30; 20 INJECTION, SOLUTION INTRAVENOUS at 00:24

## 2018-03-07 RX ADMIN — SODIUM CHLORIDE, SODIUM LACTATE, POTASSIUM CHLORIDE, AND CALCIUM CHLORIDE 125 ML/HR: 600; 310; 30; 20 INJECTION, SOLUTION INTRAVENOUS at 08:09

## 2018-03-07 RX ADMIN — HYDROCODONE BITARTRATE AND ACETAMINOPHEN 1 TABLET: 5; 325 TABLET ORAL at 09:23

## 2018-03-07 RX ADMIN — CALCIUM CARBONATE (ANTACID) CHEW TAB 500 MG 400 MG: 500 CHEW TAB at 00:41

## 2018-03-07 RX ADMIN — HYDROCODONE BITARTRATE AND ACETAMINOPHEN 1 TABLET: 5; 325 TABLET ORAL at 13:52

## 2018-03-07 RX ADMIN — CALCIUM CARBONATE (ANTACID) CHEW TAB 500 MG 400 MG: 500 CHEW TAB at 18:39

## 2018-03-07 NOTE — ED NOTES
Patient received discharge instructions by MD and nurse. Patient verbalized understanding of medication use and other discharge instructions. Updated vitals, and patient wheeled to L&D by  showing no signs of distress. Pt reports relief of most intense pain. All questions answered.
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
## 2018-03-07 NOTE — DISCHARGE INSTRUCTIONS
Bruises: Care Instructions  Your Care Instructions    Bruises occur when small blood vessels under the skin tear or rupture, most often from a twist, bump, or fall. Blood leaks into tissues under the skin and causes a black-and-blue spot that often turns colors, including purplish black, reddish blue, or yellowish green, as the bruise heals. Bruises hurt, but most are not serious and will go away on their own within 2 to 4 weeks. Sometimes, gravity causes them to spread down the body. A leg bruise usually will take longer to heal than a bruise on the face or arms. Follow-up care is a key part of your treatment and safety. Be sure to make and go to all appointments, and call your doctor if you are having problems. It's also a good idea to know your test results and keep a list of the medicines you take. How can you care for yourself at home? · Take pain medicines exactly as directed. ¨ If the doctor gave you a prescription medicine for pain, take it as prescribed. ¨ If you are not taking a prescription pain medicine, ask your doctor if you can take an over-the-counter medicine. · Put ice or a cold pack on the area for 10 to 20 minutes at a time. Put a thin cloth between the ice and your skin. · If you can, prop up the bruised area on pillows as much as possible for the next few days. Try to keep the bruise above the level of your heart. When should you call for help? Call your doctor now or seek immediate medical care if:  ? · You have signs of infection, such as:  ¨ Increased pain, swelling, warmth, or redness. ¨ Red streaks leading from the bruise. ¨ Pus draining from the bruise. ¨ A fever. ? · You have a bruise on your leg and signs of a blood clot, such as:  ¨ Increasing redness and swelling along with warmth, tenderness, and pain in the bruised area. ¨ Pain in your calf, back of the knee, thigh, or groin. ¨ Redness and swelling in your leg or groin. ? · Your pain gets worse. ? Watch closely for changes in your health, and be sure to contact your doctor if:  ? · You do not get better as expected. Where can you learn more? Go to http://nallely-art.info/. Enter [phone] in the search box to learn more about \"Bruises: Care Instructions. \"  Current as of: March 20, 2017  Content Version: 11.4  © 7763-7664 [employer]. Care instructions adapted under license by [employer] (which disclaims liability or warranty for this information). If you have questions about a medical condition or this instruction, always ask your healthcare professional. [place] [address] any warranty or liability for your use of this information. Musculoskeletal Chest Pain: Care Instructions  Your Care Instructions    Chest pain is not always a sign that something is wrong with your heart or that you have another serious problem. The doctor thinks your chest pain is caused by strained muscles or ligaments, inflamed chest cartilage, or another problem in your chest, rather than by your heart. You may need more tests to find the cause of your chest pain. Follow-up care is a key part of your treatment and safety. Be sure to make and go to all appointments, and call your doctor if you are having problems. It's also a good idea to know your test results and keep a list of the medicines you take. How can you care for yourself at home? · Take pain medicines exactly as directed. ¨ If the doctor gave you a prescription medicine for pain, take it as prescribed. ¨ If you are not taking a prescription pain medicine, ask your doctor if you can take an over-the-counter medicine. · Rest and protect the sore area. · Stop, change, or take a break from any activity that may be causing your pain or soreness. · Put ice or a cold pack on the sore area for 10 to 20 minutes at a time.  Try to do this every 1 to 2 hours for the next 3 days (when you are awake) or until the swelling goes down. Put a thin cloth between the ice and your skin. · After 2 or 3 days, apply a heating pad set on low or a warm cloth to the area that hurts. Some doctors suggest that you go back and forth between hot and cold. · Do not wrap or tape your ribs for support. This may cause you to take smaller breaths, which could increase your risk of lung problems. · Mentholated creams such as Bengay or Icy Hot may soothe sore muscles. Follow the instructions on the package. · Follow your doctor's instructions for exercising. · Gentle stretching and massage may help you get better faster. Stretch slowly to the point just before pain begins, and hold the stretch for at least 15 to 30 seconds. Do this 3 or 4 times a day. Stretch just after you have applied heat. · As your pain gets better, slowly return to your normal activities. Any increased pain may be a sign that you need to rest a while longer. When should you call for help? Call 911 anytime you think you may need emergency care. For example, call if:  ? · You have chest pain or pressure. This may occur with:  ¨ Sweating. ¨ Shortness of breath. ¨ Nausea or vomiting. ¨ Pain that spreads from the chest to the neck, jaw, or one or both shoulders or arms. ¨ Dizziness or lightheadedness. ¨ A fast or uneven pulse. After calling 911, chew 1 adult-strength aspirin. Wait for an ambulance. Do not try to drive yourself. ? · You have sudden chest pain and shortness of breath, or you cough up blood. ?Call your doctor now or seek immediate medical care if:  ? · You have any trouble breathing. ? · Your chest pain gets worse. ? · Your chest pain occurs consistently with exercise and is relieved by rest.   ? Watch closely for changes in your health, and be sure to contact your doctor if:  ? · Your chest pain does not get better after 1 week. Where can you learn more? Go to http://nallely-art.info/.   Enter V293 in the search box to learn more about \"Musculoskeletal Chest Pain: Care Instructions. \"  Current as of: March 20, 2017  Content Version: 11.4  © 4226-5877 [employer]. Care instructions adapted under license by [employer] (which disclaims liability or warranty for this information). If you have questions about a medical condition or this instruction, always ask your healthcare professional. [address] any warranty or liability for your use of this information.

## 2018-03-07 NOTE — PROGRESS NOTES
0003 L Kash LUGO at bedside to assess. Ordered IV fluid, CBC, Sample to blood bank. 0600 Pt got a little rest after taking tylenol. She is very sore across her chest, left pelvis. Her left knee is ecchymotic and tender to palpation. Pt declined an ice pack at this time. She is also getting congested. Encouraged to drink her water. 0740 Bedside and Verbal shift change report given to Juan Nix RN (oncoming nurse) by Darylene Hasty RN (offgoing nurse). Report included the following information SBAR, MAR and Recent Results.
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
## 2018-03-07 NOTE — PROGRESS NOTES
1620-bedside report from [facility] Nelsy Lara  1630-sitting up talking with friend  1700-sitting up eating  1900-pt eager to go home
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
## 2018-03-08 NOTE — PROGRESS NOTES
~9088: Bedside and Verbal shift change report given to PATRICE Anaya RN by TEZ Guzman RN. Report included the following information SBAR.   ~2010: The patient is informed that Dr Davy Mccray states that she can be discharged at 2130 tonight.  ~2113: The patient is sitting up straight in bed and heart tones are tracing maternal.  ~2120: The patient is repositioning in bed and heart tones are tracing maternal  ~2130: The patient is given verbal and a copy of the discharge instructions. The patient verbalizes understanding and all questions are answered. All belongings are collected by the . He left to pull the care around for discharge. ~2138: IV is removed in preparation for discharge home. ~2145: The patient is assisted to a wheelchair for discharge. The patient is accompanied to the ER where the patient's  is waiting in the car.

## 2018-03-13 ENCOUNTER — ROUTINE PRENATAL (OUTPATIENT)
Dept: OBGYN CLINIC | Age: 33
End: 2018-03-13

## 2018-03-13 VITALS — WEIGHT: 180.4 LBS | SYSTOLIC BLOOD PRESSURE: 122 MMHG | BODY MASS INDEX: 30.02 KG/M2 | DIASTOLIC BLOOD PRESSURE: 76 MMHG

## 2018-03-13 DIAGNOSIS — Z3A.36 36 WEEKS GESTATION OF PREGNANCY: Primary | ICD-10-CM

## 2018-03-17 LAB
B-HEM STREP SPEC QL CULT: NEGATIVE
GRBS, EXTERNAL: NEGATIVE

## 2018-03-20 ENCOUNTER — ROUTINE PRENATAL (OUTPATIENT)
Dept: OBGYN CLINIC | Age: 33
End: 2018-03-20

## 2018-03-20 VITALS — BODY MASS INDEX: 30.45 KG/M2 | WEIGHT: 183 LBS | DIASTOLIC BLOOD PRESSURE: 64 MMHG | SYSTOLIC BLOOD PRESSURE: 128 MMHG

## 2018-03-27 ENCOUNTER — ROUTINE PRENATAL (OUTPATIENT)
Dept: OBGYN CLINIC | Age: 33
End: 2018-03-27

## 2018-03-27 VITALS — DIASTOLIC BLOOD PRESSURE: 62 MMHG | SYSTOLIC BLOOD PRESSURE: 110 MMHG | WEIGHT: 189 LBS | BODY MASS INDEX: 31.45 KG/M2

## 2018-03-27 DIAGNOSIS — Z3A.38 38 WEEKS GESTATION OF PREGNANCY: Primary | ICD-10-CM

## 2018-04-03 ENCOUNTER — ROUTINE PRENATAL (OUTPATIENT)
Dept: OBGYN CLINIC | Age: 33
End: 2018-04-03

## 2018-04-03 ENCOUNTER — HOSPITAL ENCOUNTER (INPATIENT)
Age: 33
LOS: 4 days | Discharge: HOME | End: 2018-04-07
Attending: OBSTETRICS & GYNECOLOGY | Admitting: OBSTETRICS & GYNECOLOGY
Payer: COMMERCIAL

## 2018-04-03 ENCOUNTER — ANESTHESIA EVENT (OUTPATIENT)
Dept: LABOR AND DELIVERY | Age: 33
End: 2018-04-03
Payer: COMMERCIAL

## 2018-04-03 ENCOUNTER — ANESTHESIA (OUTPATIENT)
Dept: LABOR AND DELIVERY | Age: 33
End: 2018-04-03
Payer: COMMERCIAL

## 2018-04-03 VITALS — DIASTOLIC BLOOD PRESSURE: 78 MMHG | SYSTOLIC BLOOD PRESSURE: 120 MMHG | WEIGHT: 188 LBS | BODY MASS INDEX: 31.28 KG/M2

## 2018-04-03 DIAGNOSIS — G89.18 POST-OP PAIN: Primary | ICD-10-CM

## 2018-04-03 DIAGNOSIS — Z3A.39 39 WEEKS GESTATION OF PREGNANCY: Primary | ICD-10-CM

## 2018-04-03 LAB
ERYTHROCYTE [DISTWIDTH] IN BLOOD BY AUTOMATED COUNT: 15.3 % (ref 11.5–14.5)
FERN TEST, (POC): NORMAL
HCT VFR BLD AUTO: 34.3 % (ref 35–47)
HGB BLD-MCNC: 11.2 G/DL (ref 11.5–16)
MCH RBC QN AUTO: 26.2 PG (ref 26–34)
MCHC RBC AUTO-ENTMCNC: 32.7 G/DL (ref 30–36.5)
MCV RBC AUTO: 80.1 FL (ref 80–99)
NRBC # BLD: 0 K/UL (ref 0–0.01)
NRBC BLD-RTO: 0 PER 100 WBC
PLATELET # BLD AUTO: 201 K/UL (ref 150–400)
PMV BLD AUTO: 12.4 FL (ref 8.9–12.9)
RBC # BLD AUTO: 4.28 M/UL (ref 3.8–5.2)
WBC # BLD AUTO: 11.4 K/UL (ref 3.6–11)

## 2018-04-03 PROCEDURE — 36415 COLL VENOUS BLD VENIPUNCTURE: CPT | Performed by: ADVANCED PRACTICE MIDWIFE

## 2018-04-03 PROCEDURE — 74011250636 HC RX REV CODE- 250/636

## 2018-04-03 PROCEDURE — 99283 EMERGENCY DEPT VISIT LOW MDM: CPT

## 2018-04-03 PROCEDURE — 85027 COMPLETE CBC AUTOMATED: CPT | Performed by: ADVANCED PRACTICE MIDWIFE

## 2018-04-03 PROCEDURE — 65270000029 HC RM PRIVATE

## 2018-04-03 PROCEDURE — 74011250636 HC RX REV CODE- 250/636: Performed by: ADVANCED PRACTICE MIDWIFE

## 2018-04-03 PROCEDURE — 77030007880 HC KT SPN EPDRL BBMI -B

## 2018-04-03 PROCEDURE — 4A0HXCZ MEASUREMENT OF PRODUCTS OF CONCEPTION, CARDIAC RATE, EXTERNAL APPROACH: ICD-10-PCS | Performed by: OBSTETRICS & GYNECOLOGY

## 2018-04-03 PROCEDURE — 74011000250 HC RX REV CODE- 250

## 2018-04-03 RX ORDER — FENTANYL/BUPIVACAINE/NS/PF 2-1250MCG
PREFILLED PUMP RESERVOIR EPIDURAL
Status: COMPLETED
Start: 2018-04-03 | End: 2018-04-03

## 2018-04-03 RX ORDER — NALBUPHINE HYDROCHLORIDE 10 MG/ML
10 INJECTION, SOLUTION INTRAMUSCULAR; INTRAVENOUS; SUBCUTANEOUS
Status: DISCONTINUED | OUTPATIENT
Start: 2018-04-03 | End: 2018-04-04 | Stop reason: HOSPADM

## 2018-04-03 RX ORDER — BUPIVACAINE HYDROCHLORIDE 2.5 MG/ML
INJECTION, SOLUTION EPIDURAL; INFILTRATION; INTRACAUDAL AS NEEDED
Status: DISCONTINUED | OUTPATIENT
Start: 2018-04-03 | End: 2018-04-04 | Stop reason: HOSPADM

## 2018-04-03 RX ORDER — EPHEDRINE SULFATE 50 MG/ML
INJECTION, SOLUTION INTRAVENOUS
Status: DISPENSED
Start: 2018-04-03 | End: 2018-04-04

## 2018-04-03 RX ORDER — FENTANYL CITRATE 50 UG/ML
100 INJECTION, SOLUTION INTRAMUSCULAR; INTRAVENOUS
Status: DISCONTINUED | OUTPATIENT
Start: 2018-04-03 | End: 2018-04-04 | Stop reason: HOSPADM

## 2018-04-03 RX ORDER — TERBUTALINE SULFATE 1 MG/ML
0.25 INJECTION SUBCUTANEOUS AS NEEDED
Status: DISCONTINUED | OUTPATIENT
Start: 2018-04-03 | End: 2018-04-04 | Stop reason: HOSPADM

## 2018-04-03 RX ORDER — BUPIVACAINE HYDROCHLORIDE 2.5 MG/ML
15 INJECTION, SOLUTION EPIDURAL; INFILTRATION; INTRACAUDAL ONCE
Status: ACTIVE | OUTPATIENT
Start: 2018-04-04 | End: 2018-04-04

## 2018-04-03 RX ORDER — BUPIVACAINE HYDROCHLORIDE 2.5 MG/ML
INJECTION, SOLUTION EPIDURAL; INFILTRATION; INTRACAUDAL
Status: DISPENSED
Start: 2018-04-03 | End: 2018-04-04

## 2018-04-03 RX ORDER — FENTANYL CITRATE 50 UG/ML
INJECTION, SOLUTION INTRAMUSCULAR; INTRAVENOUS AS NEEDED
Status: DISCONTINUED | OUTPATIENT
Start: 2018-04-03 | End: 2018-04-04 | Stop reason: HOSPADM

## 2018-04-03 RX ORDER — NALBUPHINE HYDROCHLORIDE 10 MG/ML
2.5 INJECTION, SOLUTION INTRAMUSCULAR; INTRAVENOUS; SUBCUTANEOUS
Status: ACTIVE | OUTPATIENT
Start: 2018-04-04 | End: 2018-04-04

## 2018-04-03 RX ORDER — FENTANYL CITRATE 50 UG/ML
100 INJECTION, SOLUTION INTRAMUSCULAR; INTRAVENOUS ONCE
Status: DISCONTINUED | OUTPATIENT
Start: 2018-04-04 | End: 2018-04-04 | Stop reason: HOSPADM

## 2018-04-03 RX ORDER — NALOXONE HYDROCHLORIDE 0.4 MG/ML
0.4 INJECTION, SOLUTION INTRAMUSCULAR; INTRAVENOUS; SUBCUTANEOUS AS NEEDED
Status: DISCONTINUED | OUTPATIENT
Start: 2018-04-03 | End: 2018-04-04 | Stop reason: HOSPADM

## 2018-04-03 RX ORDER — FENTANYL CITRATE 50 UG/ML
INJECTION, SOLUTION INTRAMUSCULAR; INTRAVENOUS
Status: COMPLETED
Start: 2018-04-03 | End: 2018-04-03

## 2018-04-03 RX ORDER — FENTANYL/BUPIVACAINE/NS/PF 2-1250MCG
1-16 PREFILLED PUMP RESERVOIR EPIDURAL CONTINUOUS
Status: DISCONTINUED | OUTPATIENT
Start: 2018-04-04 | End: 2018-04-07 | Stop reason: HOSPADM

## 2018-04-03 RX ORDER — FENTANYL CITRATE 50 UG/ML
100 INJECTION, SOLUTION INTRAMUSCULAR; INTRAVENOUS
Status: ACTIVE | OUTPATIENT
Start: 2018-04-04 | End: 2018-04-04

## 2018-04-03 RX ORDER — SODIUM CHLORIDE 0.9 % (FLUSH) 0.9 %
5-10 SYRINGE (ML) INJECTION EVERY 8 HOURS
Status: DISCONTINUED | OUTPATIENT
Start: 2018-04-03 | End: 2018-04-04 | Stop reason: HOSPADM

## 2018-04-03 RX ORDER — NALBUPHINE HYDROCHLORIDE 10 MG/ML
2.5 INJECTION, SOLUTION INTRAMUSCULAR; INTRAVENOUS; SUBCUTANEOUS
Status: DISCONTINUED | OUTPATIENT
Start: 2018-04-03 | End: 2018-04-04 | Stop reason: HOSPADM

## 2018-04-03 RX ORDER — SODIUM CHLORIDE 0.9 % (FLUSH) 0.9 %
5-10 SYRINGE (ML) INJECTION AS NEEDED
Status: DISCONTINUED | OUTPATIENT
Start: 2018-04-03 | End: 2018-04-04 | Stop reason: HOSPADM

## 2018-04-03 RX ADMIN — BUPIVACAINE HYDROCHLORIDE 5 ML: 2.5 INJECTION, SOLUTION EPIDURAL; INFILTRATION; INTRACAUDAL at 23:45

## 2018-04-03 RX ADMIN — Medication 100 ML: at 23:52

## 2018-04-03 RX ADMIN — SODIUM CHLORIDE, SODIUM LACTATE, POTASSIUM CHLORIDE, AND CALCIUM CHLORIDE 1000 ML: 600; 310; 30; 20 INJECTION, SOLUTION INTRAVENOUS at 23:07

## 2018-04-03 RX ADMIN — BUPIVACAINE HYDROCHLORIDE 2 ML: 2.5 INJECTION, SOLUTION EPIDURAL; INFILTRATION; INTRACAUDAL at 23:42

## 2018-04-03 RX ADMIN — Medication 10 ML: at 20:45

## 2018-04-03 RX ADMIN — FENTANYL CITRATE 100 MCG: 50 INJECTION, SOLUTION INTRAMUSCULAR; INTRAVENOUS at 23:45

## 2018-04-03 RX ADMIN — BUPIVACAINE HYDROCHLORIDE 5 ML: 2.5 INJECTION, SOLUTION EPIDURAL; INFILTRATION; INTRACAUDAL at 23:48
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
## 2018-04-03 NOTE — IP AVS SNAPSHOT
[address] 
[phone] Patient: Patricia Estrella MRN: [id number] OTM:8/85/9049 A check barbara indicates which time of day the medication should be taken. My Medications START taking these medications Instructions Each Dose to Equal  
 Morning Noon Evening Bedtime  
 oxyCODONE-acetaminophen 5-325 mg per tablet Commonly known as:  PERCOCET Your last dose was: Your next dose is: Take 1 Tab by mouth every four (4) hours as needed. Max Daily Amount: 6 Tabs. 1 Tab ASK your doctor about these medications Instructions Each Dose to Equal  
 Morning Noon Evening Bedtime  
 calcium carbonate 200 mg calcium (500 mg) Chew Commonly known as:  TUMS Your last dose was: Your next dose is: Take 1 Tab by mouth daily. 1 Tab  
    
   
   
   
  
 magnesium 250 mg Tab Your last dose was: Your next dose is: Take  by mouth. methIMAzole 5 mg tablet Commonly known as:  TAPAZOLE Your last dose was: Your next dose is:    
   
   
 2.5 mg daily every other day PNV38-Iron Cbn&Gluc-FA-DSS-DHA 35-1- mg Cmpk Your last dose was: Your next dose is: Take  by mouth. Where to Get Your Medications Information on where to get these meds will be given to you by the nurse or doctor. ! Ask your nurse or doctor about these medications  
  oxyCODONE-acetaminophen 5-325 mg per tablet

## 2018-04-03 NOTE — PROGRESS NOTES
\"wet since Friday. \" denies vaginal bleeding, large gush of fluid, contractions. Good FM. Ferning, pooling, valsalva, nitrazine all negative. Bulging bag on exam. Labor precautions reviewed. Will schedule for IOL next Monday.

## 2018-04-04 PROBLEM — Z34.90 PREGNANCY: Status: ACTIVE | Noted: 2018-04-04

## 2018-04-04 PROCEDURE — 74011250636 HC RX REV CODE- 250/636

## 2018-04-04 PROCEDURE — 77030014007 HC SPNG HEMSTAT J&J -B

## 2018-04-04 PROCEDURE — 77030031139 HC SUT VCRL2 J&J -A

## 2018-04-04 PROCEDURE — 74011250637 HC RX REV CODE- 250/637: Performed by: OBSTETRICS & GYNECOLOGY

## 2018-04-04 PROCEDURE — 77030034849

## 2018-04-04 PROCEDURE — 75410000002 HC LABOR FEE PER 1 HR

## 2018-04-04 PROCEDURE — 77030018836 HC SOL IRR NACL ICUM -A

## 2018-04-04 PROCEDURE — 74011250636 HC RX REV CODE- 250/636: Performed by: OBSTETRICS & GYNECOLOGY

## 2018-04-04 PROCEDURE — 77030011943

## 2018-04-04 PROCEDURE — 76060000078 HC EPIDURAL ANESTHESIA: Performed by: OBSTETRICS & GYNECOLOGY

## 2018-04-04 PROCEDURE — 77030011640 HC PAD GRND REM COVD -A

## 2018-04-04 PROCEDURE — 77030018846 HC SOL IRR STRL H20 ICUM -A

## 2018-04-04 PROCEDURE — 65410000002 HC RM PRIVATE OB

## 2018-04-04 PROCEDURE — 77030011220 HC DEV ELECSURG COVD -B

## 2018-04-04 PROCEDURE — 74011250636 HC RX REV CODE- 250/636: Performed by: ADVANCED PRACTICE MIDWIFE

## 2018-04-04 PROCEDURE — 76060000078 HC EPIDURAL ANESTHESIA

## 2018-04-04 PROCEDURE — 76010000391 HC C SECN FIRST 1 HR: Performed by: OBSTETRICS & GYNECOLOGY

## 2018-04-04 PROCEDURE — 77030002933 HC SUT MCRYL J&J -A

## 2018-04-04 PROCEDURE — 77030011255 HC DSG AQUACEL AG BMS -A

## 2018-04-04 PROCEDURE — 75410000003 HC RECOV DEL/VAG/CSECN EA 0.5 HR: Performed by: OBSTETRICS & GYNECOLOGY

## 2018-04-04 PROCEDURE — 77030032490 HC SLV COMPR SCD KNE COVD -B

## 2018-04-04 RX ORDER — DOCUSATE SODIUM 100 MG/1
100 CAPSULE, LIQUID FILLED ORAL
Status: DISCONTINUED | OUTPATIENT
Start: 2018-04-04 | End: 2018-04-07 | Stop reason: HOSPADM

## 2018-04-04 RX ORDER — SODIUM CHLORIDE 0.9 % (FLUSH) 0.9 %
5-10 SYRINGE (ML) INJECTION EVERY 8 HOURS
Status: DISCONTINUED | OUTPATIENT
Start: 2018-04-04 | End: 2018-04-07 | Stop reason: HOSPADM

## 2018-04-04 RX ORDER — OXYTOCIN/RINGER'S LACTATE 20/1000 ML
PLASTIC BAG, INJECTION (ML) INTRAVENOUS
Status: COMPLETED
Start: 2018-04-04 | End: 2018-04-04

## 2018-04-04 RX ORDER — SODIUM CHLORIDE, SODIUM LACTATE, POTASSIUM CHLORIDE, CALCIUM CHLORIDE 600; 310; 30; 20 MG/100ML; MG/100ML; MG/100ML; MG/100ML
125 INJECTION, SOLUTION INTRAVENOUS CONTINUOUS
Status: DISCONTINUED | OUTPATIENT
Start: 2018-04-04 | End: 2018-04-07 | Stop reason: HOSPADM

## 2018-04-04 RX ORDER — OXYTOCIN/RINGER'S LACTATE 20/1000 ML
125-1000 PLASTIC BAG, INJECTION (ML) INTRAVENOUS
Status: CANCELLED | OUTPATIENT
Start: 2018-04-04

## 2018-04-04 RX ORDER — MORPHINE SULFATE 0.5 MG/ML
INJECTION, SOLUTION EPIDURAL; INTRATHECAL; INTRAVENOUS AS NEEDED
Status: DISCONTINUED | OUTPATIENT
Start: 2018-04-04 | End: 2018-04-04 | Stop reason: HOSPADM

## 2018-04-04 RX ORDER — LIDOCAINE HYDROCHLORIDE AND EPINEPHRINE 15; 5 MG/ML; UG/ML
INJECTION, SOLUTION EPIDURAL AS NEEDED
Status: DISCONTINUED | OUTPATIENT
Start: 2018-04-04 | End: 2018-04-04 | Stop reason: HOSPADM

## 2018-04-04 RX ORDER — ONDANSETRON 2 MG/ML
4 INJECTION INTRAMUSCULAR; INTRAVENOUS
Status: ACTIVE | OUTPATIENT
Start: 2018-04-04 | End: 2018-04-05

## 2018-04-04 RX ORDER — PHENYLEPHRINE 10 MG/250 ML(40 MCG/ML)IN 0.9 % SOD.CHLORIDE INTRAVENOUS
Status: DISPENSED
Start: 2018-04-04 | End: 2018-04-04

## 2018-04-04 RX ORDER — ACETAMINOPHEN 325 MG/1
650 TABLET ORAL
Status: DISCONTINUED | OUTPATIENT
Start: 2018-04-04 | End: 2018-04-07 | Stop reason: HOSPADM

## 2018-04-04 RX ORDER — ONDANSETRON 2 MG/ML
4 INJECTION INTRAMUSCULAR; INTRAVENOUS
Status: DISCONTINUED | OUTPATIENT
Start: 2018-04-04 | End: 2018-04-07 | Stop reason: HOSPADM

## 2018-04-04 RX ORDER — LIDOCAINE HYDROCHLORIDE AND EPINEPHRINE 20; 5 MG/ML; UG/ML
INJECTION, SOLUTION EPIDURAL; INFILTRATION; INTRACAUDAL; PERINEURAL
Status: DISPENSED
Start: 2018-04-04 | End: 2018-04-04

## 2018-04-04 RX ORDER — OXYCODONE AND ACETAMINOPHEN 5; 325 MG/1; MG/1
2 TABLET ORAL
Status: DISCONTINUED | OUTPATIENT
Start: 2018-04-04 | End: 2018-04-07 | Stop reason: HOSPADM

## 2018-04-04 RX ORDER — CEFAZOLIN SODIUM/WATER 2 G/20 ML
2 SYRINGE (ML) INTRAVENOUS ONCE
Status: COMPLETED | OUTPATIENT
Start: 2018-04-04 | End: 2018-04-04

## 2018-04-04 RX ORDER — MORPHINE SULFATE 4 MG/ML
6 INJECTION, SOLUTION INTRAMUSCULAR; INTRAVENOUS
Status: DISCONTINUED | OUTPATIENT
Start: 2018-04-04 | End: 2018-04-07 | Stop reason: HOSPADM

## 2018-04-04 RX ORDER — NALBUPHINE HYDROCHLORIDE 10 MG/ML
2.5 INJECTION, SOLUTION INTRAMUSCULAR; INTRAVENOUS; SUBCUTANEOUS
Status: ACTIVE | OUTPATIENT
Start: 2018-04-04 | End: 2018-04-05

## 2018-04-04 RX ORDER — OXYTOCIN/RINGER'S LACTATE 20/1000 ML
PLASTIC BAG, INJECTION (ML) INTRAVENOUS
Status: DISCONTINUED | OUTPATIENT
Start: 2018-04-04 | End: 2018-04-04 | Stop reason: HOSPADM

## 2018-04-04 RX ORDER — ACETAMINOPHEN 10 MG/ML
1000 INJECTION, SOLUTION INTRAVENOUS
Status: DISPENSED | OUTPATIENT
Start: 2018-04-04 | End: 2018-04-06

## 2018-04-04 RX ORDER — PHENYLEPHRINE HCL IN 0.9% NACL 0.4MG/10ML
SYRINGE (ML) INTRAVENOUS AS NEEDED
Status: DISCONTINUED | OUTPATIENT
Start: 2018-04-04 | End: 2018-04-04 | Stop reason: HOSPADM

## 2018-04-04 RX ORDER — AMMONIA 15 % (W/V)
1 AMPUL (EA) INHALATION AS NEEDED
Status: DISCONTINUED | OUTPATIENT
Start: 2018-04-04 | End: 2018-04-07 | Stop reason: HOSPADM

## 2018-04-04 RX ORDER — NALBUPHINE HYDROCHLORIDE 10 MG/ML
5 INJECTION, SOLUTION INTRAMUSCULAR; INTRAVENOUS; SUBCUTANEOUS
Status: DISCONTINUED | OUTPATIENT
Start: 2018-04-04 | End: 2018-04-07 | Stop reason: HOSPADM

## 2018-04-04 RX ORDER — NALOXONE HYDROCHLORIDE 0.4 MG/ML
0.4 INJECTION, SOLUTION INTRAMUSCULAR; INTRAVENOUS; SUBCUTANEOUS AS NEEDED
Status: DISCONTINUED | OUTPATIENT
Start: 2018-04-04 | End: 2018-04-07 | Stop reason: HOSPADM

## 2018-04-04 RX ORDER — KETOROLAC TROMETHAMINE 30 MG/ML
30 INJECTION, SOLUTION INTRAMUSCULAR; INTRAVENOUS
Status: DISCONTINUED | OUTPATIENT
Start: 2018-04-04 | End: 2018-04-04

## 2018-04-04 RX ORDER — OXYCODONE AND ACETAMINOPHEN 5; 325 MG/1; MG/1
1 TABLET ORAL
Status: DISCONTINUED | OUTPATIENT
Start: 2018-04-04 | End: 2018-04-07 | Stop reason: HOSPADM

## 2018-04-04 RX ORDER — MORPHINE SULFATE 4 MG/ML
10 INJECTION, SOLUTION INTRAMUSCULAR; INTRAVENOUS
Status: DISCONTINUED | OUTPATIENT
Start: 2018-04-04 | End: 2018-04-07 | Stop reason: HOSPADM

## 2018-04-04 RX ORDER — METHIMAZOLE 5 MG/1
2.5 TABLET ORAL EVERY OTHER DAY
Status: DISCONTINUED | OUTPATIENT
Start: 2018-04-04 | End: 2018-04-07 | Stop reason: HOSPADM

## 2018-04-04 RX ORDER — OXYTOCIN/RINGER'S LACTATE 20/1000 ML
125-1000 PLASTIC BAG, INJECTION (ML) INTRAVENOUS AS NEEDED
Status: DISCONTINUED | OUTPATIENT
Start: 2018-04-04 | End: 2018-04-07 | Stop reason: HOSPADM

## 2018-04-04 RX ORDER — ONDANSETRON 2 MG/ML
INJECTION INTRAMUSCULAR; INTRAVENOUS AS NEEDED
Status: DISCONTINUED | OUTPATIENT
Start: 2018-04-04 | End: 2018-04-04 | Stop reason: HOSPADM

## 2018-04-04 RX ORDER — SODIUM CHLORIDE 0.9 % (FLUSH) 0.9 %
SYRINGE (ML) INJECTION
Status: DISPENSED
Start: 2018-04-04 | End: 2018-04-04

## 2018-04-04 RX ORDER — HYDROCORTISONE 1 %
CREAM (GRAM) TOPICAL AS NEEDED
Status: DISCONTINUED | OUTPATIENT
Start: 2018-04-04 | End: 2018-04-07 | Stop reason: HOSPADM

## 2018-04-04 RX ORDER — SIMETHICONE 80 MG
80 TABLET,CHEWABLE ORAL
Status: DISCONTINUED | OUTPATIENT
Start: 2018-04-04 | End: 2018-04-07 | Stop reason: HOSPADM

## 2018-04-04 RX ORDER — SODIUM CHLORIDE 0.9 % (FLUSH) 0.9 %
5-10 SYRINGE (ML) INJECTION AS NEEDED
Status: DISCONTINUED | OUTPATIENT
Start: 2018-04-04 | End: 2018-04-07 | Stop reason: HOSPADM

## 2018-04-04 RX ORDER — SODIUM CHLORIDE, SODIUM LACTATE, POTASSIUM CHLORIDE, CALCIUM CHLORIDE 600; 310; 30; 20 MG/100ML; MG/100ML; MG/100ML; MG/100ML
INJECTION, SOLUTION INTRAVENOUS
Status: DISCONTINUED | OUTPATIENT
Start: 2018-04-04 | End: 2018-04-04 | Stop reason: HOSPADM

## 2018-04-04 RX ADMIN — LIDOCAINE HYDROCHLORIDE AND EPINEPHRINE 15 ML: 15; 5 INJECTION, SOLUTION EPIDURAL at 03:23

## 2018-04-04 RX ADMIN — SODIUM CHLORIDE, SODIUM LACTATE, POTASSIUM CHLORIDE, CALCIUM CHLORIDE: 600; 310; 30; 20 INJECTION, SOLUTION INTRAVENOUS at 03:23

## 2018-04-04 RX ADMIN — SODIUM CHLORIDE, SODIUM LACTATE, POTASSIUM CHLORIDE, AND CALCIUM CHLORIDE 125 ML/HR: 600; 310; 30; 20 INJECTION, SOLUTION INTRAVENOUS at 00:19

## 2018-04-04 RX ADMIN — Medication 999 UNITS/HR: at 03:58

## 2018-04-04 RX ADMIN — ACETAMINOPHEN 1000 MG: 10 INJECTION, SOLUTION INTRAVENOUS at 08:57

## 2018-04-04 RX ADMIN — ACETAMINOPHEN 1000 MG: 10 INJECTION, SOLUTION INTRAVENOUS at 17:32

## 2018-04-04 RX ADMIN — ONDANSETRON 4 MG: 2 INJECTION INTRAMUSCULAR; INTRAVENOUS at 03:40

## 2018-04-04 RX ADMIN — Medication 2 G: at 03:36

## 2018-04-04 RX ADMIN — Medication 10 ML: at 17:33

## 2018-04-04 RX ADMIN — METHIMAZOLE 2.5 MG: 5 TABLET ORAL at 17:32

## 2018-04-04 RX ADMIN — Medication 80 MCG: at 03:52

## 2018-04-04 RX ADMIN — MORPHINE SULFATE 5 MCG: 0.5 INJECTION, SOLUTION EPIDURAL; INTRATHECAL; INTRAVENOUS at 04:02

## 2018-04-04 RX ADMIN — Medication 80 MCG: at 03:56

## 2018-04-04 NOTE — ANESTHESIA PROCEDURE NOTES
Epidural Block    Performed by: Terry Mosqueda  Authorized by: Terry Mosqueda     Pre-Procedure  Indication: labor epidural

## 2018-04-04 NOTE — LACTATION NOTE
This note was copied from a baby's chart. Made initial lactation visit to G1 mother who delivered very early this morning by C/S for breech. Mother wanted assistance with infant latching on. I talked to mother specifically about nursing on demand and not allowing infant to go long periods without eating. I told mother that if she doesn't wake up on her own at 3 hours to encourage her to wake up by changing her diaper, doing skin to skin. Infant latched on well in prone position with assymetrical latch and nursed well. I showed mother how to sandwich her breast so infant can get a good latch. Feeding Plan: Mother will keep baby skin to skin as often as possible, feed on demand, respond to feeding cues, obtain latch, listen for audible swallowing, be aware of signs of oxytocin release/ cramping,thrist,sleepyness while breastfeeding, offer both breasts,and will not limit feedings    [place] behaviors reviewed, Very sleepy in first 24 hours, mother must wake baby for feedings, offer hand expressed drops, baby usually will respond and feed vigorously 6-8 times in the first day, but is important to offer 8-12 times,  After baby wakes from deep sleep usually on the 2nd or 3rd day a new behavior pattern follows. Frequent feeding during this brief behavioral phase preceeding milk transition is called cluster feeding. Typical  behavior: baby becomes vigorous at the breast and wants to feed frequently- every 1-2 hours for several feedings. Emptying of the breast twice produces double in subsequent feedings. This is the normal process by which the baby demands his/her supply. This type of frequent feeding is the stimulation which causes lactogenesis II (milk coming in).

## 2018-04-04 NOTE — PROGRESS NOTES
4/3/2018  7:56 PM  pt of Dr. Tono Mcginnis admitted to R 9 c/o bleeding and contractions. Bleeding and increased pain started at 1700. Bleeding was bright red and seen on toilet paper after using the bathroom. Pt wore peripad into hospital but no active bleeding has been seen at this time. Denies LOF and confirms +FM. Allergey to ibuprofen. : Brockton Rossy in room to assess pt, SVE 4-5/100/bulging bag. Admission orders received at this time. : Pt complaining of rectal pressure, SVE unchanged. : Dr. Cristine White at bedside for epidural placement. 1735Felicia Watts CNM at beside, AROM. Breech position suspected. Bedside US requested, breech presentation confirmed.  0309: Dr. Duncan Shen in room to discuss  risk and benefits. Pt presented opportunity for questions. Pt verbalizes understanding.   0321: Dr. Cristine White at the bedside for  redose. 56: LTCS of live female  by Dr. Duncan Shen. Apgars 9/9.   0700: TRANSFER - OUT REPORT:    Verbal report given to Breann Calderon RN(name) on [employer]  [facility] GLEN Almaraz(unit) for routine progression of care       Report consisted of patients Situation, Background, Assessment and   Recommendations(SBAR). Information from the following report(s) SBAR, OR Summary, Procedure Summary, Intake/Output, MAR and Recent Results was reviewed with the receiving nurse. Lines:   Peripheral IV 18 Left Forearm (Active)   Site Assessment Clean, dry, & intact 4/3/2018  8:40 PM   Phlebitis Assessment 0 4/3/2018  8:40 PM   Infiltration Assessment 0 4/3/2018  8:40 PM   Dressing Status Clean, dry, & intact 4/3/2018  8:40 PM   Dressing Type Tape;Transparent 4/3/2018  8:40 PM   Hub Color/Line Status Pink;Flushed;Capped 4/3/2018  8:40 PM   Action Taken Blood drawn 4/3/2018  8:40 PM        Opportunity for questions and clarification was provided.       Patient transported with:   Registered Nurse

## 2018-04-04 NOTE — PROGRESS NOTES
In room to see pt and asses - comfortable with epidrual.  5-6 cm per RN requesting eval for AROM    SVE BBOW 6 cm SROM with mild exam to determine presenting part, complete breech presentation, discussed with pt and , confirmed via bedside sono. Dr. Jayy Underwood in house as [facility]- to be consulted for surgical management of delivery.    Tiffanie Lomax CNM
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
## 2018-04-04 NOTE — ANESTHESIA PREPROCEDURE EVALUATION
Anesthetic History   No history of anesthetic complications            Review of Systems / Medical History  Patient summary reviewed, nursing notes reviewed and pertinent labs reviewed    Pulmonary  Within defined limits                 Neuro/Psych   Within defined limits           Cardiovascular  Within defined limits                     GI/Hepatic/Renal  Within defined limits   GERD           Endo/Other  Within defined limits    Hypothyroidism       Other Findings              Physical Exam    Airway  Mallampati: II  TM Distance: > 6 cm  Neck ROM: normal range of motion   Mouth opening: Normal     Cardiovascular  Regular rate and rhythm,  S1 and S2 normal,  no murmur, click, rub, or gallop             Dental  No notable dental hx       Pulmonary  Breath sounds clear to auscultation               Abdominal  GI exam deferred       Other Findings            Anesthetic Plan    ASA: 2  Anesthesia type: epidural          Induction: Intravenous  Anesthetic plan and risks discussed with: Patient

## 2018-04-04 NOTE — ROUTINE PROCESS
TRANSFER - IN REPORT:    Verbal report received from NITESH Almaraz RN(name) on [employer]  being received from L&D(unit) for routine progression of care      Report consisted of patients Situation, Background, Assessment and   Recommendations(SBAR). Information from the following report(s) SBAR was reviewed with the receiving nurse. Opportunity for questions and clarification was provided. Assessment completed upon patients arrival to unit and care assumed.

## 2018-04-04 NOTE — OP NOTES
Operative Note    Name: Rina Lemus   Medical Record Number: [id number]   YOB: 1985  Today's Date: 2018      Pre-operative Diagnosis: Breech, Graves disease    Post-operative Diagnosis: same but delivered      Operation: Low Cervical Transverse Procedure(s):   SECTION    Surgeon(s):  Conner Tang MD    Anesthesia: Epidural    Prophylactic Antibiotics: Ancef    DVT Prophylaxis: Sequential Compression Devices     Fetal Description: lundberg     Birth Information:   Information for the patient's :  Curtis Sanford [[id number]]   Delivery of a   Female [1] infant on 2018 at 3:58 AM. Apgars were 9 and 9. Umbilical Cord:     Umbilical Cord Events:     Placenta:  removal with  appearance. Amniotic Fluid Volume: Moderate     Amniotic Fluid Description:  Clear        Umbilical Cord: Nuchal Cord x  1    Placenta:  spontaneous    Specimens: none           Complications:  none    Procedure Detail:      After proper patient identification and consent, the patient was taken to the operating room, where epidural anesthesia was administered and found to be adequate. Chavira catheter had been placed using sterile technique. The patient was prepped and draped in the normal sterile fashion. Time out was performed. The abdomen was entered using the Pfannenstiel technique. The incision was taken sharply down to the rectus fascia and  off the rectus muscles sharply and bluntly, superiorly and inferiorly. The peritoneum was entered sharply well superior to the bladder without any apparent injury. The bladder flap was created without difficulty. A low transverse uterine incision was made with the scalpel and extended with blunt finger dissection. Amniotomy was performed and the fluid was small amount clear. The breech was then gently lifted up out of the pelvis and  was then delivered atraumatically.  The breech baby was delivered in the standard breech fashion with back up, gentle delivery of both legs and then both arms down to the nape of the neck. Then gentle delivery of the head was performed with reduction of a nuchal cord. The nose and mouth were suctioned. The cord was clamped and cut and the baby was handed off to Nursing staff in attendance. Placenta was then removed from the uterus. The uterus was curettaged with a moist lap pad and cleared of all clots and debris. The uterine incision was closed with 0 vicryl, double layer  in running locking fashion with good hemostasis assured followed by an embricating stitch. The posterior cul-de-sac was irrigated with warm normal saline. Good hemostasis was again reassured and the uterus was returned to the abdomen. The anterior pelvis was irrigated with warm normal saline and good hemostasis was again reassured throughout. Gelfoam was placed over the uterine incision. The rectus muscles were reapproximated with 2-0 vicryl. The fascia was closed with 0 Vicryl in a running fashion. Good hemostasis was assured. The skin was closed with a 3-0 vicryl subcuticular closure. The patient tolerated the procedure well. Sponge, lap, and needle counts were correct times three and the patient and baby were taken to recovery/postpartum room in stable condition.     Brock Cooper MD  April 4, 2018  4:53 AM

## 2018-04-04 NOTE — PROGRESS NOTES
Labor Progress Note  Patient seen, resting comfortable s/p epidural- fetal heart rate and contraction pattern evaluated.   Visit Vitals    /51    Pulse 76    Temp 98.3 °F (36.8 °C)    Resp 16    Ht 5' 5\" (1.651 m)    Wt 188 lb (85.3 kg)    LMP 06/28/2017 (Exact Date)    SpO2 95%    Breastfeeding No    BMI 31.28 kg/m2       Physical Exam:  Cervical Exam:  deferred to exam   Membranes:  clear  Uterine Activity : frequent regular  Fetal Heart Rate: Cat 1    Assessment/Plan:  Reassuring fetal status, SVE with straight cath, evaluate for AROM at that time   Branden Jackson CNM

## 2018-04-04 NOTE — ROUTINE PROCESS
0730: OB SBAR report received by Nita Fatima RN. 1135: Chavira D/C'd and IV converted to saline lock. Pt assisted to wheelchair and transferred to room 332. Pt assisted to bed and tolerated well. Pt c/o \"the room is spinning,\" but no c/o dizziness when pt back in bed. DTV by 3629.  1345: Pt assisted to bathroom by [facility]. Pt unable to void, chelsy care completed. 1415: Pt straight cathed for 275 amanda colored urine. Pt tolerated well. Fundus firm below umbilicus minus one at midline with small bleeding post void. DTV by 2015. Encouraged pt to drink fluids. 1530: Pt ambulated to bathroom without difficulty and voided. Check void by 2130.  1725: Pt ambulated to bathroom without difficulty and voided. Check void complete.

## 2018-04-04 NOTE — PROGRESS NOTES
Labor Progress Note    Consult requested by Heike Pool as pt in active labor with breech presentation. Patient seen, fetal heart rate and contraction pattern evaluated. Physical Exam:  Cervical Exam: not examined by me but CNM reports 5 cm and breech confirmed by US  Membranes:  ROM with exam by CNM  Uterine Activity: Frequency: regular  Fetal Heart Rate: Reactive  Accelerations: yes  Decelerations: none  Uterine contractions: regular    Assessment/Plan:  Reassuring fetal status. All risks, options, alternatives reviewed concerning preceding with  for breech presentation. NPO  Anest notified  Consent signed  Preop antibiotics ordered    Plan reviewed. Questions answered.       John Steen MD

## 2018-04-05 LAB
BASOPHILS # BLD: 0.1 K/UL (ref 0–0.1)
BASOPHILS NFR BLD: 1 % (ref 0–1)
DIFFERENTIAL METHOD BLD: ABNORMAL
EOSINOPHIL # BLD: 0.1 K/UL (ref 0–0.4)
EOSINOPHIL NFR BLD: 1 % (ref 0–7)
ERYTHROCYTE [DISTWIDTH] IN BLOOD BY AUTOMATED COUNT: 15.2 % (ref 11.5–14.5)
HCT VFR BLD AUTO: 29.1 % (ref 35–47)
HGB BLD-MCNC: 9.3 G/DL (ref 11.5–16)
IMM GRANULOCYTES # BLD: 0.1 K/UL (ref 0–0.04)
IMM GRANULOCYTES NFR BLD AUTO: 1 % (ref 0–0.5)
LYMPHOCYTES # BLD: 2.1 K/UL (ref 0.8–3.5)
LYMPHOCYTES NFR BLD: 17 % (ref 12–49)
MCH RBC QN AUTO: 25.7 PG (ref 26–34)
MCHC RBC AUTO-ENTMCNC: 32 G/DL (ref 30–36.5)
MCV RBC AUTO: 80.4 FL (ref 80–99)
MONOCYTES # BLD: 0.7 K/UL (ref 0–1)
MONOCYTES NFR BLD: 5 % (ref 5–13)
NEUTS SEG # BLD: 9.7 K/UL (ref 1.8–8)
NEUTS SEG NFR BLD: 76 % (ref 32–75)
NRBC # BLD: 0 K/UL (ref 0–0.01)
NRBC BLD-RTO: 0 PER 100 WBC
PLATELET # BLD AUTO: 190 K/UL (ref 150–400)
PMV BLD AUTO: 12.1 FL (ref 8.9–12.9)
RBC # BLD AUTO: 3.62 M/UL (ref 3.8–5.2)
WBC # BLD AUTO: 12.7 K/UL (ref 3.6–11)

## 2018-04-05 PROCEDURE — 74011250637 HC RX REV CODE- 250/637: Performed by: OBSTETRICS & GYNECOLOGY

## 2018-04-05 PROCEDURE — 65410000002 HC RM PRIVATE OB

## 2018-04-05 PROCEDURE — 74011250636 HC RX REV CODE- 250/636: Performed by: OBSTETRICS & GYNECOLOGY

## 2018-04-05 PROCEDURE — 36415 COLL VENOUS BLD VENIPUNCTURE: CPT | Performed by: OBSTETRICS & GYNECOLOGY

## 2018-04-05 PROCEDURE — 85025 COMPLETE CBC W/AUTO DIFF WBC: CPT | Performed by: OBSTETRICS & GYNECOLOGY

## 2018-04-05 RX ADMIN — OXYCODONE HYDROCHLORIDE AND ACETAMINOPHEN 1 TABLET: 5; 325 TABLET ORAL at 21:21

## 2018-04-05 RX ADMIN — OXYCODONE HYDROCHLORIDE AND ACETAMINOPHEN 1 TABLET: 5; 325 TABLET ORAL at 17:05

## 2018-04-05 RX ADMIN — ACETAMINOPHEN 650 MG: 325 TABLET, FILM COATED ORAL at 11:36

## 2018-04-05 RX ADMIN — ACETAMINOPHEN 650 MG: 325 TABLET, FILM COATED ORAL at 06:44

## 2018-04-05 RX ADMIN — Medication 10 ML: at 00:19

## 2018-04-05 RX ADMIN — ACETAMINOPHEN 1000 MG: 10 INJECTION, SOLUTION INTRAVENOUS at 00:19

## 2018-04-05 NOTE — ROUTINE PROCESS
Bedside shift change report given to Yobani Arteaga RN (oncoming nurse) by Akil Scott. Lissette Penny RN (offgoing nurse). Report included the following information SBAR, Kardex, Procedure Summary, Intake/Output, MAR and Recent Results.

## 2018-04-05 NOTE — ROUTINE PROCESS
Bedside shift change report given to SRINATH Murphy (oncoming nurse) by Josh Mcmahan RN (offgoing nurse). Report included the following information SBAR, Kardex, Intake/Output, MAR, Accordion and Recent Results.

## 2018-04-05 NOTE — PROGRESS NOTES
Post-Operative Day Number 1 Progress Note    Patient doing well post-op day 1 from  delivery without significant complaints. Pain controlled on current medication. Voiding without difficulty, normal lochia. Vitals:  Patient Vitals for the past 8 hrs:   BP Temp Pulse Resp   18 0330 107/62 98.8 °F (37.1 °C) 74 16   18 0030 111/72 98.1 °F (36.7 °C) 78 16     Temp (24hrs), Av.6 °F (37 °C), Min:98.1 °F (36.7 °C), Max:98.9 °F (37.2 °C)      Vital signs stable, afebrile. Exam:  Patient without distress. Abdomen soft, fundus firm at level of umbilicus, non tender. Incision dressing is dry and clean                Lower extremities are negative for swelling, cords or tenderness. Assessment and Plan:  Patient appears to be having uncomplicated post- course. Continue routine post-op care and maternal education.

## 2018-04-06 PROCEDURE — 74011250637 HC RX REV CODE- 250/637: Performed by: OBSTETRICS & GYNECOLOGY

## 2018-04-06 PROCEDURE — 65410000002 HC RM PRIVATE OB

## 2018-04-06 RX ADMIN — ACETAMINOPHEN 650 MG: 325 TABLET, FILM COATED ORAL at 07:48

## 2018-04-06 RX ADMIN — METHIMAZOLE 2.5 MG: 5 TABLET ORAL at 19:00

## 2018-04-06 RX ADMIN — OXYCODONE HYDROCHLORIDE AND ACETAMINOPHEN 1 TABLET: 5; 325 TABLET ORAL at 01:16

## 2018-04-06 RX ADMIN — ACETAMINOPHEN 650 MG: 325 TABLET, FILM COATED ORAL at 19:04

## 2018-04-06 NOTE — PROGRESS NOTES
1940 Bedside report received from SRINATH Mcmahan Rn    2135 abdominal dressing dry and intact; ambulating in room, moving well managing discomfort with pain medication as needed. 0000 Bedside shift change report given to MAGNO Mcgregor (oncoming nurse) by JULES Ferro (offgoing nurse). Report included the following information SBAR, MAR and Med Rec Status.

## 2018-04-06 NOTE — ROUTINE PROCESS
Bedside shift change report given to ERI Pearl RN (oncoming nurse) by Toy Valladares RN (offgoing nurse). Report included the following information SBAR, Kardex, Intake/Output, MAR and Recent Results.

## 2018-04-06 NOTE — PROGRESS NOTES
Anesthesia Post Operative Day 1      The patient is status post C Section with epidural anesthesia and Duramorph for pain. The patient relates the following scales: pain,mild ; itching, mild ; nausea, none. All sympyoms were treated with medications per protocol. The patient is up and ambulating and has minimal complaints. Plan: Continue to treat breakthrough symptoms as needed with protocol medictions.

## 2018-04-06 NOTE — PROGRESS NOTES
Post-Operative Day Number 2 Progress Note    Patient doing well post-op day 2 from  delivery without significant complaints. Pain controlled on current medication. Voiding without difficulty, normal lochia. Vitals:  Patient Vitals for the past 8 hrs:   BP Temp Pulse Resp   18 0051 120/80 97.9 °F (36.6 °C) 82 18     Temp (24hrs), Av.9 °F (36.6 °C), Min:97.2 °F (36.2 °C), Max:98.3 °F (36.8 °C)      Vital signs stable, afebrile. Exam:  Patient without distress. Abdomen soft, fundus firm at level of umbilicus, non tender. Incision dressing is clean dry and intact. Lower extremities are negative for swelling, cords or tenderness. Lab/Data Review:      Assessment and Plan:  Patient appears to be having uncomplicated post- course. Continue routine post-op care and maternal education.

## 2018-04-06 NOTE — ROUTINE PROCESS
Bedside shift change report given to ANTOINE Ibarra (oncoming nurse) by ANTOINE Lu (offgoing nurse). Report included the following information SBAR.

## 2018-04-07 VITALS
TEMPERATURE: 97.5 F | HEART RATE: 85 BPM | BODY MASS INDEX: 31.32 KG/M2 | OXYGEN SATURATION: 98 % | HEIGHT: 65 IN | DIASTOLIC BLOOD PRESSURE: 75 MMHG | RESPIRATION RATE: 16 BRPM | WEIGHT: 188 LBS | SYSTOLIC BLOOD PRESSURE: 123 MMHG

## 2018-04-07 PROCEDURE — 74011250637 HC RX REV CODE- 250/637: Performed by: OBSTETRICS & GYNECOLOGY

## 2018-04-07 RX ORDER — OXYCODONE AND ACETAMINOPHEN 5; 325 MG/1; MG/1
1 TABLET ORAL
Qty: 12 TAB | Refills: 0 | Status: SHIPPED | OUTPATIENT
Start: 2018-04-07

## 2018-04-07 RX ADMIN — ACETAMINOPHEN 650 MG: 325 TABLET, FILM COATED ORAL at 00:43

## 2018-04-07 RX ADMIN — ACETAMINOPHEN 650 MG: 325 TABLET, FILM COATED ORAL at 04:42

## 2018-04-07 RX ADMIN — ACETAMINOPHEN 650 MG: 325 TABLET, FILM COATED ORAL at 12:53

## 2018-04-07 NOTE — ROUTINE PROCESS
Bedside shift change report given to Sadi Brooks RN (oncoming nurse) by PATRICE Galindo RN (offgoing nurse). Report included the following information SBAR, Kardex, Procedure Summary, Intake/Output, MAR and Recent Results.
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
## 2018-04-07 NOTE — PROGRESS NOTES
Post-Operative Day Number 3 Progress/Discharge Note    Patient doing well post-op day 3 from  delivery without significant complaints. Pain controlled on current medication. Voiding without difficulty, normal lochia. Vitals:  No data found. Temp (24hrs), Av.7 °F (36.5 °C), Min:97.5 °F (36.4 °C), Max:97.9 °F (36.6 °C)      Vital signs stable, afebrile. Exam:  Patient without distress. Abdomen soft, fundus firm at level of umbilicus, non tender. Incision dry and                      clean without erythema. Lower extremities are negative for swelling, cords or tenderness. Assessment and Plan:  Patient appears to be having uncomplicated post- course. Continue routine post-op care and maternal education. Plan discharge for today with follow up in our office in 1-2 weeks.

## 2018-04-07 NOTE — LACTATION NOTE
This note was copied from a baby's chart. Made follow up lactation visit. Infant is under bili lights. Mother is nursing and giving ebm. She is using her pump from home and pumping about an ounce each time and giving with bottle. I talked to mother about the risks of giving ebm by bottle and mother said she would prefer to use dental syringe so was given 2 of them and demonstrated how to feed with them. I told mother she can also call out and ask for help with it if need be. Infant was fussing under light so mother put her to breast while I was in the room. Infant is nursing rhythmically with swallowing. Mother's nipples are tender but I don't see any visible open areas. Mother is using lanolin. Infant may possibly go home this afternoon pending repeat bilirubin. Mother has lactation support at her pediatrician's office. All questions answered and teaching complete.

## 2018-04-07 NOTE — ROUTINE PROCESS
0730: OB SBAR report received by Jaguar [place]on RN. 1800: Discharge instructions reviewed with pt and all questions answered. Prescription given. Follow up in 6 weeks with Dr. Elizabeth Amaya. Pt discharged in wheelchair.

## 2018-04-07 NOTE — DISCHARGE SUMMARY
Obstetrical Discharge Summary     Name: Mago Balderas MRN: [id number]  SSN: [id number]    YOB: 1985  Age: 34 y.o. Sex: female      Allergies: Ibuprofen    Admit Date: 4/3/2018    Discharge Date: 2018     Admitting Physician: Shakeel Henderson MD     Attending Physician:  Shakeel Henderson MD     * Admission Diagnoses: Pregnancy    * Discharge Diagnoses:   Information for the patient's :  Brooke Craft [[id number]]   Delivery of a 8 lb 12.4 oz (3.98 kg) female infant via , Low Transverse on 2018 at 3:58 AM  by . Apgars were 9 and 9. Additional Diagnoses:   Hospital Problems as of 2018  Date Reviewed: 4/3/2018          Codes Class Noted - Resolved POA    Pregnancy ICD-10-CM: Z34.90  ICD-9-CM: V22.2  2018 - Present Unknown             Lab Results   Component Value Date/Time    Rubella, External immune 2017    GrBStrep, External negative 2018    ABO,Rh B positive 2017      Immunization History   Administered Date(s) Administered    TB Skin Test (PPD) Intradermal 2013    Tdap 2018       * Procedures:   Procedure(s):   SECTION      [place]  Depression Scale  I have been able to laugh and see the funny side of things: As much as I always could  I have looked forward with enjoyment to things: As much as I ever did  I have blamed myself unnecessarily when things went wrong: Yes, some of the time  I have been anxious or worried for no good reason: Hardly ever  I have felt scared or panicky for no very good reason: No, not much  Things have been getting on top of me: No, most of the time I have coped quite well  I have been so unhappy that I have had difficulty sleeping: Not very often  I have felt sad or miserable: No, not at all  I have been so unhappy that I have been crying:  Only occasionally  The thought of harming myself has occurred to me: Never  Total Score: 7    * Discharge Condition: good    [facility] Course: Normal hospital course following the delivery. * Disposition: Home    Discharge Medications:   Current Discharge Medication List      START taking these medications    Details   oxyCODONE-acetaminophen (PERCOCET) 5-325 mg per tablet Take 1 Tab by mouth every four (4) hours as needed. Max Daily Amount: 6 Tabs. Qty: 12 Tab, Refills: 0    Associated Diagnoses: Post-op pain             * Follow-up Care/Patient Instructions:   Activity: No driving for 2 weeks  Diet: Regular Diet  Wound Care: As directed    Follow-up Information     Follow up With Details Comments [address] Vladimir Michael MD   [address] [facility]  [phone]             Signed By:  Jassi Page CNM     April 7, 2018

## 2018-04-08 NOTE — ANESTHESIA POSTPROCEDURE EVALUATION
Post-Anesthesia Evaluation and Assessment    Patient: Wayne Kwan MRN: [id number]  SSN: [id number]    YOB: 1985  Age: 34 y.o. Sex: female       Cardiovascular Function/Vital Signs  Visit Vitals    /75 (BP 1 Location: Left arm, BP Patient Position: At rest;Sitting)    Pulse 85    Temp 36.4 °C (97.5 °F)    Resp 16    Ht 5' 5\" (1.651 m)    Wt 85.3 kg (188 lb)    SpO2 98%    Breastfeeding Unknown    BMI 31.28 kg/m2       Patient is status post epidural anesthesia for Procedure(s):   SECTION. Nausea/Vomiting: None    Postoperative hydration reviewed and adequate. Pain:  Pain Scale 1: Numeric (0 - 10) (18 1345)  Pain Intensity 1: 0 (18 1345)   Managed    Neurological Status:   Neuro (WDL): Within Defined Limits (18 2135)   At baseline    Mental Status and Level of Consciousness: Arousable    Pulmonary Status:   O2 Device: Room air (18 0127)   Adequate oxygenation and airway patent    Complications related to anesthesia: None    Post-anesthesia assessment completed.  No concerns    Signed By: Jose Davidson MD     2018

## 2018-04-23 ENCOUNTER — OFFICE VISIT (OUTPATIENT)
Dept: OBGYN CLINIC | Age: 33
End: 2018-04-23

## 2018-04-23 ENCOUNTER — TELEPHONE (OUTPATIENT)
Dept: OBGYN CLINIC | Age: 33
End: 2018-04-23

## 2018-04-23 VITALS
DIASTOLIC BLOOD PRESSURE: 78 MMHG | HEART RATE: 87 BPM | BODY MASS INDEX: 27.39 KG/M2 | SYSTOLIC BLOOD PRESSURE: 118 MMHG | OXYGEN SATURATION: 98 % | WEIGHT: 164.4 LBS | RESPIRATION RATE: 16 BRPM | HEIGHT: 65 IN

## 2018-04-23 NOTE — TELEPHONE ENCOUNTER
MD ON CALL, PHONE [facility]    Date:       Pregnant: No  34 y.o.  Unknown     Attending:   Dr. Roro Montoya    Reason for call:  Postpartum CS, c/o drainage and superficial separation  No fever/chills    Plan:  Reviewed wound care. Rec MD fu in office or sooner to ER if needed. Infection precautions reviewed. I discussed the option of ER evaluation if the symptoms are severe or do not improve or if the patient wants a more thorough evaluation of her concerns that can not be provided over the phone. I advised the caller to contact the office if they have any further questions or concerns.     Danay Bennett MD

## 2018-04-23 NOTE — PROGRESS NOTES
Post-Partum Check     Patient doing well now ~2 weeks post-partum from LT. Doing well but thinks her incision is opening. Bonding with babiy. Denies pain. Breast feeding. Lochia appropriate. Denies signs/symptoms of depression. Vitals:    Visit Vitals    /78 (BP 1 Location: Left arm, BP Patient Position: Sitting)    Pulse 87    Resp 16    Ht 5' 5\" (1.651 m)    Wt 164 lb 6.4 oz (74.6 kg)    SpO2 98%    BMI 27.36 kg/m2        Exam:  Patient without distress. Abdomen soft, nontender. Incision clean, dry, healing, right aspect superficially  (~1mm deep, not needing intervention)               Lower extremities are negative for swelling, cords, or tenderness. Assessment and Plan:    Patient appears to be having uncomplicated post course. Contraception: pills  Moving to [address] [place] [address] soon.     Drew Fuentes MD

## 2018-05-03 ENCOUNTER — TELEPHONE (OUTPATIENT)
Dept: OBGYN CLINIC | Age: 33
End: 2018-05-03

## 2018-05-03 NOTE — TELEPHONE ENCOUNTER
Medical records have not been received by [facility]'s new OBGYN, Dr. Sully Jim [phone] or  [phone]. Per [facility] patient needs to call [facility], release of medical records have been forwarded to them on 4/36/18. Call [facility] [phone]    Left message on Mr. Romulo Coppola' voicemail. To let him know the above.

## 2024-01-22 NOTE — TELEPHONE ENCOUNTER
SHELLIE verified to speak to Yvonne He, on behalf of the patient. Mr. Rachna Mtz advised of MD recommendations and verbalized understanding. Patient is wondering if what she can do about the itching in the mean time.      Please advise [phone]

## 2024-02-08 NOTE — PROGRESS NOTES
Doing well. Didn't give work note we provided. Reviewed discomforts of pregnancy. PTL  Precautions. Wants to know if she can BF on thyroid medications. Needs TDAP at next visit. Spoke with pt. She states she started with diarrhea about 4-5 days ago. Today it has been watery. She has had 5-6 watery stools today. She experienced vomiting this morning. She has not tried to eat or drink anything since. She did see her medications in her vomitus earlier today and did not attempt to retake medications. Denies fever. States she is still having issues with fatigue since COVID diagnosis. She does report continued nasal congestion. She has not seen PCP.    Discussed with pt that if she is unable to drink water or hold down her medication, she needs to report to the ED. Also discussed that if she has further watery diarrhea, she should report to the ED for evaluation. Discussed that if she is unable to hold down medications or is experiencing continued watery stools, she is not absorbing her medications and is at risk for rejection. Also discussed that she could be dehydrated and may need IV fluids. She was hesitant about an ED visit but agreed that if she is unable to hold down water and medications and continues with watery diarrhea, she will come to ED. She agreed to have a CMV lab test and stool studies today. She understands that we will not have results back today.     ----- Message from Akua Cabrera sent at 2/8/2024 10:41 AM CST -----  Regarding: Speak to Nurse Cavanaugh - vomiting please advise  Contact: PT  [phone]  The patient called requesting to speak to Nurse Cavanaugh, states she has been vomiting this morning and vomiting her morning medications. Also having trouble with some diarrhea. Please call to advise at your earliest opportunity.    No further information provided      Patient can be contacted @# [phone]

## (undated) DEVICE — COVERALL PREM SMS 2XL KNIT --

## (undated) DEVICE — STERILE POLYISOPRENE POWDER-FREE SURGICAL GLOVES WITH EMOLLIENT COATING: Brand: PROTEXIS

## (undated) DEVICE — DRSG AQUACEL SURG 3.5 X 10IN -- CONVERT TO ITEM 370183

## (undated) DEVICE — SOLUTION IRRIG 1000ML H2O STRL BLT

## (undated) DEVICE — STERILE POLYISOPRENE POWDER-FREE SURGICAL GLOVES: Brand: PROTEXIS

## (undated) DEVICE — LIGHT HANDLE: Brand: DEVON

## (undated) DEVICE — CATH FOLEY 16F LUBRI-SIL IC --

## (undated) DEVICE — ROYALSILK SURGICAL GOWN, L: Brand: CONVERTORS

## (undated) DEVICE — ELECTROSURGICAL DEVICE HOLSTER;FOR USE WITH MAXIMUM PEAK VOLTAGE OF 4000 V: Brand: FORCE TRIVERSE

## (undated) DEVICE — REM POLYHESIVE ADULT PATIENT RETURN ELECTRODE: Brand: VALLEYLAB

## (undated) DEVICE — KENDALL SCD EXPRESS SLEEVES, KNEE LENGTH, MEDIUM: Brand: KENDALL SCD

## (undated) DEVICE — SUTURE VCRL SZ 0 L36IN ABSRB UD L40MM CT 1/2 CIR TAPERPOINT J958H

## (undated) DEVICE — 3000CC GUARDIAN II: Brand: GUARDIAN

## (undated) DEVICE — MEDI-VAC NON-CONDUCTIVE SUCTION TUBING: Brand: CARDINAL HEALTH

## (undated) DEVICE — SURGIFOAM SPNG SZ 100

## (undated) DEVICE — DRAPE FLD WRM W44XL66IN C6L FOR INTRATEMP SYS THERMABASIN

## (undated) DEVICE — SOLUTION IV 1000ML 0.9% SOD CHL

## (undated) DEVICE — ROYAL SILK SURGICAL GOWN, XXL: Brand: CONVERTORS

## (undated) DEVICE — DEVON™ KNEE AND BODY STRAP 60" X 3" (1.5 M X 7.6 CM): Brand: DEVON

## (undated) DEVICE — (D)PREP SKN CHLRAPRP APPL 26ML -- CONVERT TO ITEM 371833

## (undated) DEVICE — PACK PROCEDURE SURG C SECT KT SMH

## (undated) DEVICE — SOLIDIFIER MEDC 1200ML -- CONVERT TO 356117

## (undated) DEVICE — SUTURE MCRYL SZ 3-0 L27IN ABSRB UD L60MM KS STR REV CUT Y523H